# Patient Record
Sex: MALE | Race: WHITE | NOT HISPANIC OR LATINO | Employment: FULL TIME | URBAN - METROPOLITAN AREA
[De-identification: names, ages, dates, MRNs, and addresses within clinical notes are randomized per-mention and may not be internally consistent; named-entity substitution may affect disease eponyms.]

---

## 2018-02-07 ENCOUNTER — HOSPITAL ENCOUNTER (OUTPATIENT)
Dept: OTHER | Facility: HOSPITAL | Age: 48
Discharge: HOME | End: 2018-02-07
Attending: SURGERY | Admitting: SURGERY

## 2018-02-07 LAB
ANION GAP SERPL CALC-SCNC: 12.7 MMOL/L (ref 10–20)
BACTERIA SPEC AEROBE CULT: NORMAL
BASOPHILS # BLD AUTO: 0 10*3/UL (ref 0–0.2)
BASOPHILS NFR BLD AUTO: 0 % (ref 0–2)
BUN SERPL-MCNC: 14 MG/DL (ref 8–20)
BUN/CREAT SERPL: 11.7 (ref 6.2–20.3)
CALCIUM SERPL-MCNC: 9.8 MG/DL (ref 8.9–10.3)
CHLORIDE SERPL-SCNC: 100 MMOL/L (ref 101–111)
CONV CO2: 29 MMOL/L (ref 22–32)
CREAT UR-MCNC: 1.2 MG/DL (ref 0.7–1.2)
DIFFERENTIAL METHOD BLD: (no result)
EOSINOPHIL # BLD AUTO: 0.1 10*3/UL (ref 0–0.3)
EOSINOPHIL # BLD AUTO: 1 % (ref 0–3)
ERYTHROCYTE [DISTWIDTH] IN BLOOD BY AUTOMATED COUNT: 13.1 % (ref 11.5–14.5)
GLUCOSE SERPL-MCNC: 86 MG/DL (ref 65–99)
HCT VFR BLD AUTO: 53 % (ref 40–54)
HGB BLD-MCNC: (no result) G/DL (ref 14–18)
LYMPHOCYTES # BLD AUTO: 3.2 10*3/UL (ref 0.8–4.8)
LYMPHOCYTES NFR BLD AUTO: 31 % (ref 18–42)
Lab: NORMAL
MCH RBC QN AUTO: 33.5 PG (ref 26–32)
MCHC RBC AUTO-ENTMCNC: 34.9 G/DL (ref 32–36)
MCV RBC AUTO: 96 FL (ref 80–94)
MICRO REPORT STATUS: NORMAL
MONOCYTES # BLD AUTO: 1.3 10*3/UL (ref 0.1–1.3)
MONOCYTES NFR BLD AUTO: 13 % (ref 2–11)
NEUTROPHILS # BLD AUTO: 5.7 10*3/UL (ref 2.3–8.6)
NEUTROPHILS NFR BLD AUTO: 55 % (ref 50–75)
NRBC BLD AUTO-RTO: 0 /100{WBCS}
NRBC/RBC NFR BLD MANUAL: 0 10*3/UL
PLATELET # BLD AUTO: 171 10*3/UL (ref 150–450)
PMV BLD AUTO: 9.9 FL (ref 7.4–10.4)
POTASSIUM SERPL-SCNC: 3.7 MMOL/L (ref 3.6–5.1)
RBC # BLD AUTO: 5.53 10*6/UL (ref 4.6–6)
SODIUM SERPL-SCNC: 138 MMOL/L (ref 136–144)
SPECIMEN SOURCE: NORMAL
WBC # BLD AUTO: 10.3 10*3/UL (ref 4.5–11.5)

## 2018-02-12 ENCOUNTER — HOSPITAL ENCOUNTER (OUTPATIENT)
Dept: PREOP | Facility: HOSPITAL | Age: 48
Setting detail: HOSPITAL OUTPATIENT SURGERY
Discharge: HOME | End: 2018-02-12
Attending: SURGERY | Admitting: SURGERY

## 2019-09-01 ENCOUNTER — APPOINTMENT (OUTPATIENT)
Dept: GENERAL RADIOLOGY | Facility: HOSPITAL | Age: 49
End: 2019-09-01

## 2019-09-01 ENCOUNTER — HOSPITAL ENCOUNTER (EMERGENCY)
Facility: HOSPITAL | Age: 49
Discharge: HOME | End: 2019-09-01
Admitting: EMERGENCY MEDICINE

## 2019-09-01 VITALS
DIASTOLIC BLOOD PRESSURE: 96 MMHG | HEART RATE: 76 BPM | TEMPERATURE: 98.2 F | RESPIRATION RATE: 19 BRPM | SYSTOLIC BLOOD PRESSURE: 155 MMHG | HEIGHT: 75 IN | WEIGHT: 223.99 LBS | BODY MASS INDEX: 27.85 KG/M2 | OXYGEN SATURATION: 96 %

## 2019-09-01 DIAGNOSIS — S52.602A CLOSED FRACTURE OF DISTAL END OF LEFT ULNA, UNSPECIFIED FRACTURE MORPHOLOGY, INITIAL ENCOUNTER: ICD-10-CM

## 2019-09-01 DIAGNOSIS — S51.812A LACERATION OF LEFT FOREARM, INITIAL ENCOUNTER: Primary | ICD-10-CM

## 2019-09-01 PROCEDURE — 73090 X-RAY EXAM OF FOREARM: CPT

## 2019-09-01 PROCEDURE — 90715 TDAP VACCINE 7 YRS/> IM: CPT | Performed by: NURSE PRACTITIONER

## 2019-09-01 PROCEDURE — 73110 X-RAY EXAM OF WRIST: CPT

## 2019-09-01 PROCEDURE — 99283 EMERGENCY DEPT VISIT LOW MDM: CPT

## 2019-09-01 PROCEDURE — 25010000002 TDAP 5-2.5-18.5 LF-MCG/0.5 SUSPENSION: Performed by: NURSE PRACTITIONER

## 2019-09-01 PROCEDURE — 90471 IMMUNIZATION ADMIN: CPT | Performed by: NURSE PRACTITIONER

## 2019-09-01 PROCEDURE — 29105 APPLICATION LONG ARM SPLINT: CPT | Performed by: NURSE PRACTITIONER

## 2019-09-01 RX ORDER — CEPHALEXIN 500 MG/1
500 CAPSULE ORAL 3 TIMES DAILY
Qty: 30 CAPSULE | Refills: 0 | Status: SHIPPED | OUTPATIENT
Start: 2019-09-01

## 2019-09-01 RX ORDER — HYDROCODONE BITARTRATE AND ACETAMINOPHEN 7.5; 325 MG/1; MG/1
1-2 TABLET ORAL EVERY 6 HOURS PRN
Qty: 15 TABLET | Refills: 0 | Status: SHIPPED | OUTPATIENT
Start: 2019-09-01

## 2019-09-01 RX ORDER — TRAMADOL HYDROCHLORIDE 50 MG/1
50 TABLET ORAL ONCE
Status: COMPLETED | OUTPATIENT
Start: 2019-09-01 | End: 2019-09-01

## 2019-09-01 RX ADMIN — TETANUS TOXOID, REDUCED DIPHTHERIA TOXOID AND ACELLULAR PERTUSSIS VACCINE, ADSORBED 0.5 ML: 5; 2.5; 8; 8; 2.5 SUSPENSION INTRAMUSCULAR at 10:48

## 2019-09-01 RX ADMIN — TRAMADOL HYDROCHLORIDE 50 MG: 50 TABLET, FILM COATED ORAL at 10:48

## 2019-09-01 NOTE — ED PROVIDER NOTES
Subjective   Chief Complaint: Left arm pain  Context: Patient reports that he injured his left arm around 5 PM yesterday evening jumping in between boats.  Complains of pain to his left wrist and forearm.  He reports that he has a laceration on his left forearm.  Tetanus is not up-to-date.  Duration: 5 PM   timing: Constant  Severity: Mild to moderate  Associated symptoms and or modifying factors: As above          History provided by:  Patient      Review of Systems   Constitutional: Negative for fever.   Musculoskeletal: Positive for joint swelling.   Skin: Positive for wound.   Neurological: Negative for weakness and numbness.       History reviewed. No pertinent past medical history.    No Known Allergies    History reviewed. No pertinent surgical history.    History reviewed. No pertinent family history.    Social History     Socioeconomic History   • Marital status:      Spouse name: Not on file   • Number of children: Not on file   • Years of education: Not on file   • Highest education level: Not on file   Tobacco Use   • Smoking status: Never Smoker   Substance and Sexual Activity   • Drug use: No   • Sexual activity: Defer           Objective   Physical Exam  The patient is well-developed, well-nourished, alert, cooperative and in no acute distress.    HEENT exam is normocephalic and atraumatic. Pupils equal ,round, reactive to light. Extraocular muscles are intact. Conjunctivae non- injected, sclerae anicteric, lids without ptosis, edema or erythema.  Mucous membranes are moist.     Extremities: There is no significant deformity or joint abnormality.  The left wrist, there is pain on palpation of the left wrist and forearm.  There is a 4 cm laceration on the left forearm . peripheral pulses are intact.    Neurologic: Oriented x3 without focal neurological deficits.    Skin shows no rash, petechiae, or purpura.    Splint - Cast - Strapping  Date/Time: 9/1/2019 12:01 PM  Performed by: Gale  "FREDI Sylvester  Authorized by: Nga Beasley APRN     Consent:     Consent obtained:  Verbal    Consent given by:  Patient    Risks discussed:  Discoloration, numbness, pain and swelling    Alternatives discussed:  No treatment  Pre-procedure details:     Sensation:  Normal  Procedure details:     Laterality:  Left    Location:  Wrist    Wrist:  L wrist    Cast type:  Short arm    Splint type:  Sugar tong    Supplies:  Ortho-Glass  Post-procedure details:     Pain:  Improved    Sensation:  Normal    Patient tolerance of procedure:  Tolerated well, no immediate complications               ED Course        Labs Reviewed - No data to display  Xr Forearm 2 View Left    Result Date: 9/1/2019  1. Minimally displaced fracture at the junction of the proximal two thirds to distal third of the diaphysis of the ulna 2. Fracture is mildly comminuted 3. No evidence of open injury, there is mild soft tissue swelling  Electronically Signed By-Zachary Giang Jr. On:9/1/2019 10:53 AM This report was finalized on [id number] by  Zachary Giang Jr., .    Xr Wrist 3+ View Left    Result Date: 9/1/2019  1 fracture of the distal ulna as described in forearm report 2. No evidence of carpal or metacarpal fracture 3. Radial carpal joint is intact  Electronically Signed By-Zachary Giang Jr. On:9/1/2019 10:55 AM This report was finalized on [id number] by  Zachary Giang Jr., .    Medications   Tdap (BOOSTRIX) injection 0.5 mL (0.5 mL Intramuscular Given 9/1/19 1048)   traMADol (ULTRAM) tablet 50 mg (50 mg Oral Given 9/1/19 1048)     /84 (BP Location: Right arm, Patient Position: Sitting)   Pulse 82   Temp 98 °F (36.7 °C) (Oral)   Resp 19   Ht 190.5 cm (75\")   Wt 102 kg (223 lb 15.8 oz)   SpO2 94%   BMI 28.00 kg/m²             MDM  Number of Diagnoses or Management Options  Closed fracture of distal end of left ulna, unspecified fracture morphology, initial encounter:   Laceration of left forearm, initial encounter:   Diagnosis " management comments: MEDICAL DECISION  Comorbidities:  Differentials:  ; this list is not all inclusive and does not constitute the entirety of considered causes  Radiology interpretation:  Images reviewed by me and interpreted by radiologist,   Tetanus was updated.  Laceration was cleansed by nursing staff and Steri-Strips were applied by nursing staff as well as dressing.  Discussed with patient healing by secondary intention related to delayed presentation for laceration.  Patient will be placed on Keflex and Norco    Patient was placed in an Ortho-Glass sugar tong splint and sling neurovascularly intact with good anatomic alignment post splint application..       Amount and/or Complexity of Data Reviewed  Tests in the radiology section of CPT®: ordered and reviewed          Final diagnoses:   Laceration of left forearm, initial encounter   Closed fracture of distal end of left ulna, unspecified fracture morphology, initial encounter            Nga Beasley, FREDI  09/01/19 1204

## 2019-09-01 NOTE — DISCHARGE INSTRUCTIONS
Keep splint clean, dry, and intact.  Ice and elevate.  Norco for pain if needed.  Follow-up with Dr. Rogers next week.  Keflex as prescribed.

## 2025-06-30 ENCOUNTER — HOSPITAL ENCOUNTER (INPATIENT)
Facility: HOSPITAL | Age: 55
LOS: 2 days | Discharge: HOME | End: 2025-07-02
Attending: EMERGENCY MEDICINE | Admitting: INTERNAL MEDICINE
Payer: COMMERCIAL

## 2025-06-30 ENCOUNTER — APPOINTMENT (OUTPATIENT)
Dept: CARDIOLOGY | Facility: HOSPITAL | Age: 55
End: 2025-06-30
Payer: COMMERCIAL

## 2025-06-30 DIAGNOSIS — I21.19 ACUTE ST ELEVATION MYOCARDIAL INFARCTION (STEMI) INVOLVING OTHER CORONARY ARTERY OF INFERIOR WALL: Primary | ICD-10-CM

## 2025-06-30 PROBLEM — I21.11 STEMI INVOLVING RIGHT CORONARY ARTERY: Chronic | Status: ACTIVE | Noted: 2025-06-30

## 2025-06-30 PROBLEM — I21.11 STEMI INVOLVING RIGHT CORONARY ARTERY: Status: ACTIVE | Noted: 2025-06-30

## 2025-06-30 LAB
ACT BLD: 158 SECONDS (ref 89–137)
ACT BLD: 182 SECONDS (ref 89–137)
ACT BLD: 354 SECONDS (ref 89–137)
ALBUMIN SERPL-MCNC: 4.2 G/DL (ref 3.5–5.2)
ALBUMIN/GLOB SERPL: 1.4 G/DL
ALP SERPL-CCNC: 71 U/L (ref 39–117)
ALT SERPL W P-5'-P-CCNC: 25 U/L (ref 1–41)
ANION GAP SERPL CALCULATED.3IONS-SCNC: 14.3 MMOL/L (ref 5–15)
AST SERPL-CCNC: 31 U/L (ref 1–40)
BASOPHILS # BLD AUTO: 0.05 10*3/MM3 (ref 0–0.2)
BASOPHILS NFR BLD AUTO: 0.5 % (ref 0–1.5)
BILIRUB SERPL-MCNC: 0.7 MG/DL (ref 0–1.2)
BUN SERPL-MCNC: 17.5 MG/DL (ref 6–20)
BUN/CREAT SERPL: 11.4 (ref 7–25)
CALCIUM SPEC-SCNC: 9.3 MG/DL (ref 8.6–10.5)
CHLORIDE SERPL-SCNC: 98 MMOL/L (ref 98–107)
CO2 SERPL-SCNC: 25.7 MMOL/L (ref 22–29)
CREAT SERPL-MCNC: 1.54 MG/DL (ref 0.76–1.27)
DEPRECATED RDW RBC AUTO: 46.5 FL (ref 37–54)
EGFRCR SERPLBLD CKD-EPI 2021: 53.3 ML/MIN/1.73
EOSINOPHIL # BLD AUTO: 0.09 10*3/MM3 (ref 0–0.4)
EOSINOPHIL NFR BLD AUTO: 0.9 % (ref 0.3–6.2)
ERYTHROCYTE [DISTWIDTH] IN BLOOD BY AUTOMATED COUNT: 13.6 % (ref 12.3–15.4)
GEN 5 1HR TROPONIN T REFLEX: 377 NG/L
GLOBULIN UR ELPH-MCNC: 2.9 GM/DL
GLUCOSE SERPL-MCNC: 139 MG/DL (ref 65–99)
HCT VFR BLD AUTO: 53.1 % (ref 37.5–51)
HGB BLD-MCNC: 18.1 G/DL (ref 13–17.7)
HOLD SPECIMEN: NORMAL
HOLD SPECIMEN: NORMAL
IMM GRANULOCYTES # BLD AUTO: 0.03 10*3/MM3 (ref 0–0.05)
IMM GRANULOCYTES NFR BLD AUTO: 0.3 % (ref 0–0.5)
INR PPP: 1.03 (ref 0.9–1.1)
LYMPHOCYTES # BLD AUTO: 3.6 10*3/MM3 (ref 0.7–3.1)
LYMPHOCYTES NFR BLD AUTO: 36.3 % (ref 19.6–45.3)
MAGNESIUM SERPL-MCNC: 2.3 MG/DL (ref 1.6–2.6)
MCH RBC QN AUTO: 32.1 PG (ref 26.6–33)
MCHC RBC AUTO-ENTMCNC: 34.1 G/DL (ref 31.5–35.7)
MCV RBC AUTO: 94.1 FL (ref 79–97)
MONOCYTES # BLD AUTO: 1.12 10*3/MM3 (ref 0.1–0.9)
MONOCYTES NFR BLD AUTO: 11.3 % (ref 5–12)
NEUTROPHILS NFR BLD AUTO: 5.04 10*3/MM3 (ref 1.7–7)
NEUTROPHILS NFR BLD AUTO: 50.7 % (ref 42.7–76)
NRBC BLD AUTO-RTO: 0 /100 WBC (ref 0–0.2)
PLATELET # BLD AUTO: 193 10*3/MM3 (ref 140–450)
PMV BLD AUTO: 10.6 FL (ref 6–12)
POTASSIUM SERPL-SCNC: 3.4 MMOL/L (ref 3.5–5.2)
PROT SERPL-MCNC: 7.1 G/DL (ref 6–8.5)
PROTHROMBIN TIME: 13.4 SECONDS (ref 11.7–14.2)
RBC # BLD AUTO: 5.64 10*6/MM3 (ref 4.14–5.8)
SODIUM SERPL-SCNC: 138 MMOL/L (ref 136–145)
TROPONIN T % DELTA: 33
TROPONIN T NUMERIC DELTA: 93 NG/L
TROPONIN T SERPL HS-MCNC: 284 NG/L
WBC NRBC COR # BLD AUTO: 9.93 10*3/MM3 (ref 3.4–10.8)
WHOLE BLOOD HOLD COAG: NORMAL
WHOLE BLOOD HOLD SPECIMEN: NORMAL

## 2025-06-30 PROCEDURE — 85025 COMPLETE CBC W/AUTO DIFF WBC: CPT | Performed by: EMERGENCY MEDICINE

## 2025-06-30 PROCEDURE — 25010000002 HEPARIN (PORCINE) PER 1000 UNITS

## 2025-06-30 PROCEDURE — 25810000003 SODIUM CHLORIDE 0.9 % SOLUTION: Performed by: INTERNAL MEDICINE

## 2025-06-30 PROCEDURE — C1725 CATH, TRANSLUMIN NON-LASER: HCPCS | Performed by: INTERNAL MEDICINE

## 2025-06-30 PROCEDURE — 25010000002 EPTIFIBATIDE PER 5 MG: Performed by: INTERNAL MEDICINE

## 2025-06-30 PROCEDURE — 93458 L HRT ARTERY/VENTRICLE ANGIO: CPT | Performed by: INTERNAL MEDICINE

## 2025-06-30 PROCEDURE — 25010000002 MIDAZOLAM PER 1 MG: Performed by: INTERNAL MEDICINE

## 2025-06-30 PROCEDURE — 4A023N7 MEASUREMENT OF CARDIAC SAMPLING AND PRESSURE, LEFT HEART, PERCUTANEOUS APPROACH: ICD-10-PCS | Performed by: INTERNAL MEDICINE

## 2025-06-30 PROCEDURE — C9606 PERC D-E COR REVASC W AMI S: HCPCS | Performed by: INTERNAL MEDICINE

## 2025-06-30 PROCEDURE — 36415 COLL VENOUS BLD VENIPUNCTURE: CPT

## 2025-06-30 PROCEDURE — 93005 ELECTROCARDIOGRAM TRACING: CPT | Performed by: EMERGENCY MEDICINE

## 2025-06-30 PROCEDURE — 99223 1ST HOSP IP/OBS HIGH 75: CPT | Performed by: INTERNAL MEDICINE

## 2025-06-30 PROCEDURE — 25010000002 NITROGLYCERIN 5 MG/ML SOLUTION: Performed by: INTERNAL MEDICINE

## 2025-06-30 PROCEDURE — C1894 INTRO/SHEATH, NON-LASER: HCPCS | Performed by: INTERNAL MEDICINE

## 2025-06-30 PROCEDURE — 92941 PRQ TRLML REVSC TOT OCCL AMI: CPT | Performed by: INTERNAL MEDICINE

## 2025-06-30 PROCEDURE — 93005 ELECTROCARDIOGRAM TRACING: CPT

## 2025-06-30 PROCEDURE — 83735 ASSAY OF MAGNESIUM: CPT | Performed by: EMERGENCY MEDICINE

## 2025-06-30 PROCEDURE — 25010000002 EPTIFIBATIDE 20 MG/10ML SOLUTION: Performed by: INTERNAL MEDICINE

## 2025-06-30 PROCEDURE — C1769 GUIDE WIRE: HCPCS | Performed by: INTERNAL MEDICINE

## 2025-06-30 PROCEDURE — 25010000002 ONDANSETRON PER 1 MG

## 2025-06-30 PROCEDURE — 93010 ELECTROCARDIOGRAM REPORT: CPT | Performed by: INTERNAL MEDICINE

## 2025-06-30 PROCEDURE — 93306 TTE W/DOPPLER COMPLETE: CPT | Performed by: INTERNAL MEDICINE

## 2025-06-30 PROCEDURE — 027035Z DILATION OF CORONARY ARTERY, ONE ARTERY WITH TWO DRUG-ELUTING INTRALUMINAL DEVICES, PERCUTANEOUS APPROACH: ICD-10-PCS | Performed by: INTERNAL MEDICINE

## 2025-06-30 PROCEDURE — C1887 CATHETER, GUIDING: HCPCS | Performed by: INTERNAL MEDICINE

## 2025-06-30 PROCEDURE — B2151ZZ FLUOROSCOPY OF LEFT HEART USING LOW OSMOLAR CONTRAST: ICD-10-PCS | Performed by: INTERNAL MEDICINE

## 2025-06-30 PROCEDURE — 84484 ASSAY OF TROPONIN QUANT: CPT | Performed by: INTERNAL MEDICINE

## 2025-06-30 PROCEDURE — 85347 COAGULATION TIME ACTIVATED: CPT | Performed by: INTERNAL MEDICINE

## 2025-06-30 PROCEDURE — 25010000002 FENTANYL CITRATE (PF) 100 MCG/2ML SOLUTION: Performed by: INTERNAL MEDICINE

## 2025-06-30 PROCEDURE — 99152 MOD SED SAME PHYS/QHP 5/>YRS: CPT | Performed by: INTERNAL MEDICINE

## 2025-06-30 PROCEDURE — 85347 COAGULATION TIME ACTIVATED: CPT

## 2025-06-30 PROCEDURE — B2111ZZ FLUOROSCOPY OF MULTIPLE CORONARY ARTERIES USING LOW OSMOLAR CONTRAST: ICD-10-PCS | Performed by: INTERNAL MEDICINE

## 2025-06-30 PROCEDURE — 25010000002 LIDOCAINE 1 % SOLUTION: Performed by: INTERNAL MEDICINE

## 2025-06-30 PROCEDURE — 84484 ASSAY OF TROPONIN QUANT: CPT | Performed by: EMERGENCY MEDICINE

## 2025-06-30 PROCEDURE — 85610 PROTHROMBIN TIME: CPT | Performed by: EMERGENCY MEDICINE

## 2025-06-30 PROCEDURE — 25510000001 IOPAMIDOL PER 1 ML: Performed by: INTERNAL MEDICINE

## 2025-06-30 PROCEDURE — C1874 STENT, COATED/COV W/DEL SYS: HCPCS | Performed by: INTERNAL MEDICINE

## 2025-06-30 PROCEDURE — 25010000002 HEPARIN (PORCINE) PER 1000 UNITS: Performed by: INTERNAL MEDICINE

## 2025-06-30 PROCEDURE — 93005 ELECTROCARDIOGRAM TRACING: CPT | Performed by: INTERNAL MEDICINE

## 2025-06-30 PROCEDURE — 99285 EMERGENCY DEPT VISIT HI MDM: CPT

## 2025-06-30 PROCEDURE — 80053 COMPREHEN METABOLIC PANEL: CPT | Performed by: EMERGENCY MEDICINE

## 2025-06-30 PROCEDURE — 93306 TTE W/DOPPLER COMPLETE: CPT

## 2025-06-30 DEVICE — XIENCE SKYPOINT™ EVEROLIMUS ELUTING CORONARY STENT SYSTEM 4.00 MM X 18 MM / RAPID-EXCHANGE
Type: IMPLANTABLE DEVICE | Site: CORONARY | Status: FUNCTIONAL
Brand: XIENCE SKYPOINT™

## 2025-06-30 DEVICE — XIENCE SKYPOINT™ EVEROLIMUS ELUTING CORONARY STENT SYSTEM 4.50 MM X 28 MM / RAPID-EXCHANGE
Type: IMPLANTABLE DEVICE | Site: CORONARY | Status: FUNCTIONAL
Brand: XIENCE SKYPOINT™

## 2025-06-30 RX ORDER — SODIUM CHLORIDE 9 MG/ML
3 INJECTION, SOLUTION INTRAVENOUS CONTINUOUS
Status: DISPENSED | OUTPATIENT
Start: 2025-06-30 | End: 2025-06-30

## 2025-06-30 RX ORDER — ASPIRIN 81 MG/1
TABLET, CHEWABLE ORAL
Status: COMPLETED
Start: 2025-06-30 | End: 2025-06-30

## 2025-06-30 RX ORDER — ASPIRIN 81 MG/1
324 TABLET, CHEWABLE ORAL ONCE
Status: COMPLETED | OUTPATIENT
Start: 2025-06-30 | End: 2025-06-30

## 2025-06-30 RX ORDER — NITROGLYCERIN 0.4 MG/1
0.4 TABLET SUBLINGUAL
Status: DISCONTINUED | OUTPATIENT
Start: 2025-06-30 | End: 2025-07-02 | Stop reason: HOSPADM

## 2025-06-30 RX ORDER — SODIUM CHLORIDE 9 MG/ML
50 INJECTION, SOLUTION INTRAVENOUS CONTINUOUS
Status: DISCONTINUED | OUTPATIENT
Start: 2025-06-30 | End: 2025-07-02 | Stop reason: HOSPADM

## 2025-06-30 RX ORDER — EPTIFIBATIDE 0.75 MG/ML
INJECTION, SOLUTION INTRAVENOUS
Status: COMPLETED | OUTPATIENT
Start: 2025-06-30 | End: 2025-06-30

## 2025-06-30 RX ORDER — SODIUM CHLORIDE 0.9 % (FLUSH) 0.9 %
10 SYRINGE (ML) INJECTION AS NEEDED
Status: DISCONTINUED | OUTPATIENT
Start: 2025-06-30 | End: 2025-07-02 | Stop reason: HOSPADM

## 2025-06-30 RX ORDER — NITROGLYCERIN 20 MG/100ML
INJECTION INTRAVENOUS
Status: DISCONTINUED
Start: 2025-06-30 | End: 2025-06-30 | Stop reason: WASHOUT

## 2025-06-30 RX ORDER — ACETAMINOPHEN 325 MG/1
650 TABLET ORAL EVERY 4 HOURS PRN
Status: DISCONTINUED | OUTPATIENT
Start: 2025-06-30 | End: 2025-07-02 | Stop reason: HOSPADM

## 2025-06-30 RX ORDER — TICAGRELOR 90 MG/1
90 TABLET, FILM COATED ORAL 2 TIMES DAILY
Status: DISCONTINUED | OUTPATIENT
Start: 2025-06-30 | End: 2025-07-02 | Stop reason: HOSPADM

## 2025-06-30 RX ORDER — NITROGLYCERIN 5 MG/ML
INJECTION, SOLUTION INTRAVENOUS
Status: DISCONTINUED | OUTPATIENT
Start: 2025-06-30 | End: 2025-06-30 | Stop reason: HOSPADM

## 2025-06-30 RX ORDER — LIDOCAINE HYDROCHLORIDE 10 MG/ML
INJECTION, SOLUTION INFILTRATION; PERINEURAL
Status: DISCONTINUED | OUTPATIENT
Start: 2025-06-30 | End: 2025-06-30 | Stop reason: HOSPADM

## 2025-06-30 RX ORDER — TICAGRELOR 90 MG/1
TABLET, FILM COATED ORAL
Status: DISCONTINUED | OUTPATIENT
Start: 2025-06-30 | End: 2025-06-30 | Stop reason: HOSPADM

## 2025-06-30 RX ORDER — HEPARIN SODIUM 1000 [USP'U]/ML
INJECTION, SOLUTION INTRAVENOUS; SUBCUTANEOUS
Status: DISCONTINUED | OUTPATIENT
Start: 2025-06-30 | End: 2025-06-30 | Stop reason: HOSPADM

## 2025-06-30 RX ORDER — SODIUM CHLORIDE 9 MG/ML
250 INJECTION, SOLUTION INTRAVENOUS ONCE AS NEEDED
Status: DISCONTINUED | OUTPATIENT
Start: 2025-06-30 | End: 2025-07-02 | Stop reason: HOSPADM

## 2025-06-30 RX ORDER — HEPARIN SODIUM 1000 [USP'U]/ML
INJECTION, SOLUTION INTRAVENOUS; SUBCUTANEOUS
Status: COMPLETED
Start: 2025-06-30 | End: 2025-06-30

## 2025-06-30 RX ORDER — ATORVASTATIN CALCIUM 40 MG/1
40 TABLET, FILM COATED ORAL NIGHTLY
Status: DISCONTINUED | OUTPATIENT
Start: 2025-06-30 | End: 2025-07-02 | Stop reason: HOSPADM

## 2025-06-30 RX ORDER — HYDROCODONE BITARTRATE AND ACETAMINOPHEN 7.5; 325 MG/1; MG/1
2 TABLET ORAL EVERY 6 HOURS PRN
Refills: 0 | Status: DISCONTINUED | OUTPATIENT
Start: 2025-06-30 | End: 2025-07-02 | Stop reason: HOSPADM

## 2025-06-30 RX ORDER — IOPAMIDOL 755 MG/ML
INJECTION, SOLUTION INTRAVASCULAR
Status: DISCONTINUED | OUTPATIENT
Start: 2025-06-30 | End: 2025-06-30 | Stop reason: HOSPADM

## 2025-06-30 RX ORDER — ONDANSETRON 2 MG/ML
INJECTION INTRAMUSCULAR; INTRAVENOUS
Status: COMPLETED
Start: 2025-06-30 | End: 2025-06-30

## 2025-06-30 RX ORDER — HEPARIN SODIUM 1000 [USP'U]/ML
4000 INJECTION, SOLUTION INTRAVENOUS; SUBCUTANEOUS ONCE
Status: COMPLETED | OUTPATIENT
Start: 2025-06-30 | End: 2025-06-30

## 2025-06-30 RX ORDER — MIDAZOLAM HYDROCHLORIDE 1 MG/ML
INJECTION, SOLUTION INTRAMUSCULAR; INTRAVENOUS
Status: DISCONTINUED | OUTPATIENT
Start: 2025-06-30 | End: 2025-06-30 | Stop reason: HOSPADM

## 2025-06-30 RX ORDER — FENTANYL CITRATE 50 UG/ML
INJECTION, SOLUTION INTRAMUSCULAR; INTRAVENOUS
Status: DISCONTINUED | OUTPATIENT
Start: 2025-06-30 | End: 2025-06-30 | Stop reason: HOSPADM

## 2025-06-30 RX ORDER — BISOPROLOL FUMARATE 5 MG/1
5 TABLET, FILM COATED ORAL
Status: DISCONTINUED | OUTPATIENT
Start: 2025-06-30 | End: 2025-07-02 | Stop reason: HOSPADM

## 2025-06-30 RX ORDER — POTASSIUM CHLORIDE 1500 MG/1
20 TABLET, EXTENDED RELEASE ORAL ONCE
Status: COMPLETED | OUTPATIENT
Start: 2025-06-30 | End: 2025-06-30

## 2025-06-30 RX ORDER — SODIUM CHLORIDE 9 MG/ML
INJECTION, SOLUTION INTRAVENOUS
Status: COMPLETED | OUTPATIENT
Start: 2025-06-30 | End: 2025-06-30

## 2025-06-30 RX ORDER — ONDANSETRON 2 MG/ML
4 INJECTION INTRAMUSCULAR; INTRAVENOUS ONCE
Status: COMPLETED | OUTPATIENT
Start: 2025-06-30 | End: 2025-06-30

## 2025-06-30 RX ORDER — EPTIFIBATIDE 2 MG/ML
INJECTION, SOLUTION INTRAVENOUS
Status: DISCONTINUED | OUTPATIENT
Start: 2025-06-30 | End: 2025-06-30 | Stop reason: HOSPADM

## 2025-06-30 RX ADMIN — ONDANSETRON 4 MG: 2 INJECTION INTRAMUSCULAR; INTRAVENOUS at 15:25

## 2025-06-30 RX ADMIN — ATORVASTATIN CALCIUM 40 MG: 40 TABLET, FILM COATED ORAL at 22:15

## 2025-06-30 RX ADMIN — SODIUM CHLORIDE 3 ML/KG/HR: 9 INJECTION, SOLUTION INTRAVENOUS at 17:40

## 2025-06-30 RX ADMIN — ACETAMINOPHEN 650 MG: 325 TABLET, FILM COATED ORAL at 17:29

## 2025-06-30 RX ADMIN — HEPARIN SODIUM: 1000 INJECTION, SOLUTION INTRAVENOUS; SUBCUTANEOUS at 15:25

## 2025-06-30 RX ADMIN — ASPIRIN: 81 TABLET, CHEWABLE ORAL at 15:25

## 2025-06-30 RX ADMIN — TICAGRELOR 90 MG: 90 TABLET ORAL at 22:15

## 2025-06-30 RX ADMIN — BISOPROLOL FUMARATE 5 MG: 5 TABLET ORAL at 18:36

## 2025-06-30 RX ADMIN — POTASSIUM CHLORIDE 20 MEQ: 1500 TABLET, EXTENDED RELEASE ORAL at 18:36

## 2025-06-30 NOTE — CONSULTS
Cardiology Consult Note    Patient Identification:  Name: Amrik Dowell  Age: 55 y.o.  Sex: male  :  1970  MRN: [id number]             Requesting Physician : Dr. Storm Ndiaye    Reason for Consultation / Chief Complaint :   STEMI    History of Present Illness:      Mr. Amrik Dowell has PMH of    Hypertension  Erythrocytosis/polycythemia  Positive family history of premature CAD in grandfather  Cigarette smoker    Presented through emergency room with complaint of chest pain.  Patient has been having chest pain for over a day now which is burning and type radiating to the left  shoulder, thought it was acid reflux worse therefore came into the hospital.  Was found to have ST elevation inferiorly therefore interventional cardiology was consulted.      Assessment:  :    Acute inferior wall STEMI  CAD  Hypertension  Dyslipidemia  Elevated creatinine  Positive family history  Cigarette smoker      Recommendations / Plan:        Patient presented with chest pain which was over 1 day duration.  EKG in the emergency room revealed ST elevation in inferior leads therefore interventional cardiology was consulted.  Patient was having chest pain, was taken to the Cath Lab for primary PCI.  Risk-benefit alternatives explained and patient underwent drug-eluting stent to proximal and mid RCA which was subtotal.  And was hazy and with plaque.  Patient was given Integrilin intravenously, IV heparin intravenously and started on Brilinta orally.  Patient's LVEDP was low at 6 mmHg, will hydrate and monitor renal function.  Will give Brilinta 90 twice daily, baby aspirin, statins and beta-blockers as tolerated.  Counseled on smoking cessation.  Discussed with patient his wife and mother by bedside.  Sinus rhythm          Cardiographics  ECG: EKG tracing was  personally reviewed/interpreted by me  ECG 12 Lead Rhythm Change   Preliminary Result   HEART RATE=92  bpm   RR Qvbonzsu=868  ms   NE Udgofjao=329  ms   P Horizontal  Axis=-3  deg   P Front Axis=66  deg   QRSD Ydpdqcue=553  ms   QT Xaxbipsf=068  ms   YQuY=441  ms   QRS Axis=-16  deg   T Wave Axis=-29  deg   - ABNORMAL ECG -   Sinus rhythm   Ventricular premature complex   Evolving inferior infarct since 30-Jun-2025 15:16:35   ST elevation, consider anterolateral injury   When compared with ECG of 30-Jun-2025 15:16:35,   Significant rate increase   New or worsened ischemia or infarction   Date and Time of Study:2025-06-30 17:32:55      Telemetry Scan   Final Result      ECG 12 Lead Chest Pain   Preliminary Result   HEART RATE=61  bpm   RR Skbtcqjw=776  ms   OK Solttezb=684  ms   P Horizontal Axis=0  deg   P Front Axis=57  deg   QRSD Interval=92  ms   QT Omoszzqb=457  ms   YFfZ=475  ms   QRS Axis=58  deg   T Wave Axis=81  deg   - ABNORMAL ECG -   Sinus rhythm   Inferior infarct, acute (RCA)   ST elevation, consider lateral injury   Date and Time of Study:2025-06-30 15:16:35      ECG 12 Lead Chest Pain    (Results Pending)       Telemetry: Sinus rhythm             Diagnosis Plan   1. Acute ST elevation myocardial infarction (STEMI) involving other coronary artery of inferior wall                             Past Medical History:  No past medical history on file.  Past Surgical History:  No past surgical history on file.   Allergies:  No Known Allergies  Home Meds:  Medications Prior to Admission   Medication Sig Dispense Refill Last Dose/Taking    HYDROcodone-acetaminophen (NORCO) 7.5-325 MG per tablet Take 1-2 tablets by mouth Every 6 (Six) Hours As Needed for Moderate Pain . 15 tablet 0      Current Meds:     Current Facility-Administered Medications:     acetaminophen (TYLENOL) tablet 650 mg, 650 mg, Oral, Q4H PRN, Warner Zaidi MD, 650 mg at 06/30/25 1729    atorvastatin (LIPITOR) tablet 40 mg, 40 mg, Oral, Nightly, Warner Zaidi MD    atropine sulfate (0.1 mg/mL) Intravenous 0.5 mg / 5 mL, 0.5 mg, Intravenous, Q5 Min PRN, Warner Zaidi MD     "bisoprolol (ZEBeta) tablet 5 mg, 5 mg, Oral, Q24H, Warner Zaidi MD    [Held by provider] HYDROcodone-acetaminophen (NORCO) 7.5-325 MG per tablet 2 tablet, 2 tablet, Oral, Q6H PRN, Warner Zaidi MD    nitroglycerin (NITROSTAT) SL tablet 0.4 mg, 0.4 mg, Sublingual, Q5 Min PRN, Warner Zaidi MD    potassium chloride (KLOR-CON M20) CR tablet 20 mEq, 20 mEq, Oral, Once, Day, Beatriz G, APRN    sodium chloride 0.9 % flush 10 mL, 10 mL, Intravenous, PRN, Warner Zaidi MD    sodium chloride 0.9 % infusion 250 mL, 250 mL, Intravenous, Once PRN, Warner Zaidi MD    sodium chloride 0.9 % infusion, 50 mL/hr, Intravenous, Continuous, Warner Zaidi MD    sodium chloride 0.9 % infusion, 3 mL/kg/hr, Intravenous, Continuous, Warner Zaidi MD, Last Rate: 309 mL/hr at 06/30/25 1740, 3 mL/kg/hr at 06/30/25 1740  Social History:   Social History     Tobacco Use    Smoking status: Never    Smokeless tobacco: Not on file   Substance Use Topics    Alcohol use: Not on file      Family History:  No family history on file.     Review of Systems : Review of Systems   All other systems reviewed and are negative.             Constitutional:  Temp:  [97.7 °F (36.5 °C)-98.7 °F (37.1 °C)] 97.7 °F (36.5 °C)  Heart Rate:  [71-94] 71  Resp:  [15-18] 18  BP: (129-147)/(77-93) 131/87    Physical Exam   /87   Pulse 71   Temp 97.7 °F (36.5 °C) (Oral)   Resp 18   Ht 190.5 cm (75\")   Wt 103 kg (227 lb 15.3 oz)   SpO2 99%   BMI 28.49 kg/m²   Physical Exam  General:  Appears in no acute distress  Eyes: Sclerae are anicteric,  conjunctivae are clear   HEENT:  No JVD. Thyroid not visibly enlarged. No mucosal pallor or cyanosis  Respiratory: Respirations regular and unlabored at rest.  Bilaterally good breath sounds with good air entry in all fields. No crackles, rubs or wheezes auscultated  Cardiovascular: S1,S2 Regular rate and rhythm. No murmur, rub or gallop " auscultated. No pretibial pitting edema  Gastrointestinal: Abdomen nondistended.  Musculoskeletal:  No abnormal movements  Extremities: No digital clubbing or cyanosis  Skin: Color pink. Skin warm and dry to touch.   Neuro: Alert and awake, no lateralizing deficits appreciated        Echocardiogram:       STRESS TEST        Cardiolite (Tc-99m sestamibi) stress test    HEART CATHETERIZATION  No results found for this or any previous visit.        Imaging  Chest X-ray:   Imaging Results (Last 24 Hours)       ** No results found for the last 24 hours. **            Lab Review: I have reviewed the labs  Results from last 7 days   Lab Units 06/30/25  1528   HSTROP T ng/L 284*     Results from last 7 days   Lab Units 06/30/25  1528   MAGNESIUM mg/dL 2.3     Results from last 7 days   Lab Units 06/30/25  1528   SODIUM mmol/L 138   POTASSIUM mmol/L 3.4*   BUN mg/dL 17.5   CREATININE mg/dL 1.54*   CALCIUM mg/dL 9.3             Results from last 7 days   Lab Units 06/30/25  1528   WBC 10*3/mm3 9.93   HEMOGLOBIN g/dL 18.1*   HEMATOCRIT % 53.1*   PLATELETS 10*3/mm3 193     Results from last 7 days   Lab Units 06/30/25  1528   INR  1.03             Warner Zaidi MD  6/30/2025, 18:22 EDT      EMR Dragon/Transcription:   Dictated utilizing Dragon dictation

## 2025-06-30 NOTE — Clinical Note
First balloon inflation max pressure = 10 lionel. First balloon inflation duration = 10 seconds. Second inflation of balloon - Max pressure = 10 lionel. 2nd Inflation of balloon - Duration = 11 seconds.

## 2025-06-30 NOTE — ED PROVIDER NOTES
"Subjective   History of Present Illness  Chief complaint: Chest pain    55-year-old male presents with chest pain.  Patient states he has had pain intermittently over the past couple days but it became worse this morning.  He describes as a heaviness on his chest that radiates to his left shoulder.  He denies any significant shortness of breath.  He has had no diaphoresis or nausea.  He denies any alleviating or exacerbating factors.    History provided by:  Patient      Review of Systems   Constitutional:  Negative for fever.   HENT:  Negative for congestion.    Respiratory:  Negative for cough and shortness of breath.    Cardiovascular:  Positive for chest pain.   Gastrointestinal:  Negative for abdominal pain and vomiting.   Musculoskeletal:  Negative for back pain.   Neurological:  Negative for headaches.   Psychiatric/Behavioral:  Negative for confusion.        No past medical history on file.    No Known Allergies    No past surgical history on file.    No family history on file.    Social History     Socioeconomic History    Marital status:    Tobacco Use    Smoking status: Never   Substance and Sexual Activity    Drug use: No    Sexual activity: Defer       /77   Pulse 80   Ht 190.5 cm (75\")   Wt 103 kg (227 lb 15.3 oz)   SpO2 97%   BMI 28.49 kg/m²       Objective   Physical Exam  Vitals and nursing note reviewed.   Constitutional:       Appearance: He is ill-appearing.   HENT:      Head: Normocephalic and atraumatic.   Cardiovascular:      Rate and Rhythm: Normal rate and regular rhythm.      Heart sounds: Normal heart sounds.   Pulmonary:      Effort: Pulmonary effort is normal. No respiratory distress.      Breath sounds: Normal breath sounds.   Abdominal:      Palpations: Abdomen is soft.      Tenderness: There is no abdominal tenderness.   Musculoskeletal:      Right lower leg: No tenderness. No edema.      Left lower leg: No tenderness. No edema.   Skin:     General: Skin is warm and " dry.   Neurological:      Mental Status: He is alert and oriented to person, place, and time.         Procedures           ED Course      My interpretation of EKG shows sinus rhythm, rate of 61, ST elevation inferiorly with reciprocal changes consistent with acute inferior STEMI                                                 Medical Decision Making  Problems Addressed:  Acute ST elevation myocardial infarction (STEMI) involving other coronary artery of inferior wall: complicated acute illness or injury    Amount and/or Complexity of Data Reviewed  Labs: ordered.  Radiology: ordered.  ECG/medicine tests: ordered.    Risk  Prescription drug management.      Initial EKG showed inferior STEMI.  STEMI protocol was initiated.  I discussed with Dr. Zaidi with cardiology who will take the patient to the Cath Lab.  Patient remained hemodynamically stable throughout his short ER stay      Final diagnoses:   Acute ST elevation myocardial infarction (STEMI) involving other coronary artery of inferior wall       ED Disposition  ED Disposition       ED Disposition   Send to Cath Lab    Condition   --    Comment   --               No follow-up provider specified.       Medication List      No changes were made to your prescriptions during this visit.            Storm Ndiaye MD  06/30/25 1524

## 2025-06-30 NOTE — H&P
"Critical Care History and Physical     Amrik Dowell : 1970 MRN:[id number] LOS:0 ROOM: Pool/Cath     Reason for admission: STEMI involving right coronary artery     Assessment / Plan     STEMI  Coronary artery disease involving native coronary artery  New diagnosis  HS Troponin 284, monitor reflex.  Cardiac catheterization 2025 with PCI and stents to the culprit lesion, RCA  c/w aspirin, beta blockers and statins.    Erythrocytosis/polycythemia  Per patient report he has serial monitoring and his PCP instructed him when to go donate blood  Hemoglobin 18.1, hematocrit 53.1  Recommend follow-up with PCP, defer to discharging provider    Tobacco abuse disorder  Smokes 2 packs cigarettes per day  Started smoking at 15 years of age (41 years)  Strongly encouraged to stop smoking for cardiac health and general lifestyle  The patient reports he is ready to attempt quitting          Nutrition:   NPO Diet NPO Type: Strict NPO     VTE Prophylaxis:  No VTE prophylaxis order currently exists.         History of Present illness     Amrik Dowell is a 55 y.o. male with PMH of erythrocytosis and tobacco abuse disorder who presented to the ED today for evaluation of chest pain.  The pain had been present intermittently for 2 to 3 days which felt like a burning sensation similar to heartburn and \"felt like I just needed to belch\".  He treated his symptoms with over-the-counter heartburn medications with intermittent relief.  He reports associated pain in the left shoulder which was present for most of the weekend.  He states that he is fairly active and felt the shoulder pain was related to some activity.  He reports that his pain occurred both at rest and with activity.  He denies associated shortness of air, nausea, vomiting, palpitations, headache, blurred vision or numbness/tingling of the periphery.  He states that he just generally felt unwell all over.  He had a couple of instances of sweating however he " associated it with the heat outside.  Today his chest pressure/burning pain became more persistent and he had increased sharp pains in his left shoulder therefore presented to the ED for further evaluation.  In the ED, EKG revealed ST elevation and a code STEMI was activated with the patient being taken emergently to the cardiac catheterization lab.  The finding was a 100% occlusion of the RCA which underwent PCI and stent placement with successful revascularization.  The patient has been admitted to the CVICU for post procedure medical management.  The patient reports that he is pain-free at this time    ACP: no ACP documentation on file the patient's wife Maty is his next of kin and default alternate decision-maker    Patient was seen and examined on 06/30/25 at 16:39 EDT .      Past Medical/Surgical/Social/Family History & Allergies     No past medical history on file.   No past surgical history on file.   Social History     Socioeconomic History    Marital status:    Tobacco Use    Smoking status: Never   Substance and Sexual Activity    Drug use: No    Sexual activity: Defer      No family history on file.   No Known Allergies   Social Drivers of Health     Tobacco Use: Unknown (2/20/2020)    Patient History     Smoking Tobacco Use: Never     Smokeless Tobacco Use: Unknown     Passive Exposure: Not on file   Alcohol Use: Not on file   Financial Resource Strain: Not on file   Food Insecurity: Not on file   Transportation Needs: Not on file   Physical Activity: Not on file   Stress: Not on file   Social Connections: Unknown (10/12/2023)    Family and Community Support     Help with Day-to-Day Activities: Not on file     Lonely or Isolated: Not on file   Interpersonal Safety: Not At Risk (6/30/2025)    Abuse Screen     Unsafe at Home or Work/School: no     Feels Threatened by Someone?: no     Does Anyone Keep You from Contacting Others or Doint Things Outside the Home?: no     Physical Sign of Abuse  Present: no   Depression: Not on file   Housing Stability: Unknown (10/12/2023)    Housing Stability     Current Living Arrangements: Not on file     Potentially Unsafe Housing Conditions: Not on file   Utilities: Not on file   Health Literacy: Unknown (10/12/2023)    Education     Help with school or training?: Not on file     Preferred Language: Not on file   Employment: Unknown (10/12/2023)    Employment     Do you want help finding or keeping work or a job?: Not on file   Disabilities: Unknown (10/12/2023)    Disabilities     Concentrating, Remembering, or Making Decisions Difficulty: Not on file     Doing Errands Independently Difficulty: Not on file        Home Medications     Prior to Admission medications    Medication Sig Start Date End Date Taking? Authorizing Provider   cephalexin (KEFLEX) 500 MG capsule Take 1 capsule by mouth 3 (Three) Times a Day. 9/1/19   Nga Butts APRN   HYDROcodone-acetaminophen (NORCO) 7.5-325 MG per tablet Take 1-2 tablets by mouth Every 6 (Six) Hours As Needed for Moderate Pain . 9/1/19   gNa uBtts APRN        Objective / Physical Exam     Vital signs:  Temp: 97.7 °F (36.5 °C)  BP: 147/93  Heart Rate: 94  Resp: 18  SpO2: 97 %  Weight: 103 kg (227 lb 15.3 oz)    Admission Weight: Weight: 103 kg (227 lb 15.3 oz)    Physical Exam  Vitals and nursing note reviewed.   Constitutional:       General: He is not in acute distress.     Appearance: Normal appearance.   HENT:      Head: Normocephalic and atraumatic.      Right Ear: External ear normal.      Left Ear: External ear normal.      Nose: Nose normal.   Eyes:      General: No scleral icterus.     Conjunctiva/sclera: Conjunctivae normal.      Pupils: Pupils are equal, round, and reactive to light.   Cardiovascular:      Heart sounds: Normal heart sounds, S1 normal and S2 normal. No murmur heard.  Pulmonary:      Effort: Pulmonary effort is normal. No respiratory distress.      Breath sounds: Normal breath sounds. No  wheezing or rhonchi.   Abdominal:      General: Bowel sounds are normal. There is no distension.      Palpations: Abdomen is soft.   Musculoskeletal:      Cervical back: Neck supple. No rigidity.      Right lower leg: No edema.      Left lower leg: No edema.      Comments: Right femoral cath site with sheath in place, no hematoma or ecchymosis   Skin:     General: Skin is warm and dry.   Neurological:      General: No focal deficit present.      Mental Status: He is alert and oriented to person, place, and time.   Psychiatric:      Comments: Calm and pleasant          Labs     Results from last 7 days   Lab Units 06/30/25  1528   WBC 10*3/mm3 9.93   HEMOGLOBIN g/dL 18.1*   HEMATOCRIT % 53.1*   PLATELETS 10*3/mm3 193      Results from last 7 days   Lab Units 06/30/25  1528   SODIUM mmol/L 138   POTASSIUM mmol/L 3.4*   CHLORIDE mmol/L 98   CO2 mmol/L 25.7   ANION GAP mmol/L 14.3   BUN mg/dL 17.5   CREATININE mg/dL 1.54*   GLUCOSE mg/dL 139*   MAGNESIUM mg/dL 2.3   ALT (SGPT) U/L 25   AST (SGOT) U/L 31   ALK PHOS U/L 71            Imaging     Chest X ray: CXR not indicated as of yet this admission    EKG: My independent evaluation showed sinus rhythm with ST elevation     Current Medications     Scheduled Meds:        Continuous Infusions:          FREDI Haney   Critical Care  06/30/25   16:39 EDT

## 2025-07-01 DIAGNOSIS — I21.11 ST ELEVATION MYOCARDIAL INFARCTION INVOLVING RIGHT CORONARY ARTERY: Primary | ICD-10-CM

## 2025-07-01 LAB
ANION GAP SERPL CALCULATED.3IONS-SCNC: 8.1 MMOL/L (ref 5–15)
ANION GAP SERPL CALCULATED.3IONS-SCNC: 9.7 MMOL/L (ref 5–15)
AORTIC DIMENSIONLESS INDEX: 0.69 (DI)
AV MEAN PRESS GRAD SYS DOP V1V2: 2 MMHG
AV VMAX SYS DOP: 94.7 CM/SEC
BH CV ECHO MEAS - ACS: 1.8 CM
BH CV ECHO MEAS - AO MAX PG: 3.6 MMHG
BH CV ECHO MEAS - AO V2 VTI: 20.4 CM
BH CV ECHO MEAS - AVA(I,D): 2.6 CM2
BH CV ECHO MEAS - EDV(CUBED): 175.6 ML
BH CV ECHO MEAS - EDV(MOD-SP4): 106 ML
BH CV ECHO MEAS - EF(MOD-SP4): 31.6 %
BH CV ECHO MEAS - ESV(CUBED): 97.3 ML
BH CV ECHO MEAS - ESV(MOD-SP4): 72.5 ML
BH CV ECHO MEAS - FS: 17.9 %
BH CV ECHO MEAS - IVS/LVPW: 1 CM
BH CV ECHO MEAS - IVSD: 1 CM
BH CV ECHO MEAS - LA DIMENSION: 3.6 CM
BH CV ECHO MEAS - LAT PEAK E' VEL: 6.4 CM/SEC
BH CV ECHO MEAS - LV DIASTOLIC VOL/BSA (35-75): 45.8 CM2
BH CV ECHO MEAS - LV MASS(C)D: 219.7 GRAMS
BH CV ECHO MEAS - LV MAX PG: 1.72 MMHG
BH CV ECHO MEAS - LV MEAN PG: 1 MMHG
BH CV ECHO MEAS - LV SYSTOLIC VOL/BSA (12-30): 31.3 CM2
BH CV ECHO MEAS - LV V1 MAX: 65.5 CM/SEC
BH CV ECHO MEAS - LV V1 VTI: 11.8 CM
BH CV ECHO MEAS - LVIDD: 5.6 CM
BH CV ECHO MEAS - LVIDS: 4.6 CM
BH CV ECHO MEAS - LVOT AREA: 4.5 CM2
BH CV ECHO MEAS - LVOT DIAM: 2.4 CM
BH CV ECHO MEAS - LVPWD: 1 CM
BH CV ECHO MEAS - MED PEAK E' VEL: 6.3 CM/SEC
BH CV ECHO MEAS - MV A MAX VEL: 61.3 CM/SEC
BH CV ECHO MEAS - MV DEC SLOPE: 286 CM/SEC2
BH CV ECHO MEAS - MV DEC TIME: 0.22 SEC
BH CV ECHO MEAS - MV E MAX VEL: 68.1 CM/SEC
BH CV ECHO MEAS - MV E/A: 1.11
BH CV ECHO MEAS - MV MAX PG: 3.2 MMHG
BH CV ECHO MEAS - MV MEAN PG: 1 MMHG
BH CV ECHO MEAS - MV P1/2T: 90.9 MSEC
BH CV ECHO MEAS - MV V2 VTI: 24.4 CM
BH CV ECHO MEAS - MVA(P1/2T): 2.42 CM2
BH CV ECHO MEAS - MVA(VTI): 2.19 CM2
BH CV ECHO MEAS - PA ACC TIME: 0.07 SEC
BH CV ECHO MEAS - PA V2 MAX: 61.6 CM/SEC
BH CV ECHO MEAS - RAP SYSTOLE: 8 MMHG
BH CV ECHO MEAS - RV MAX PG: 1.14 MMHG
BH CV ECHO MEAS - RV V1 MAX: 53.5 CM/SEC
BH CV ECHO MEAS - RV V1 VTI: 10.9 CM
BH CV ECHO MEAS - RVDD: 2.9 CM
BH CV ECHO MEAS - RVSP: 18.4 MMHG
BH CV ECHO MEAS - SV(LVOT): 53.4 ML
BH CV ECHO MEAS - SV(MOD-SP4): 33.5 ML
BH CV ECHO MEAS - SVI(LVOT): 23.1 ML/M2
BH CV ECHO MEAS - SVI(MOD-SP4): 14.5 ML/M2
BH CV ECHO MEAS - TAPSE (>1.6): 2.04 CM
BH CV ECHO MEAS - TR MAX PG: 10.4 MMHG
BH CV ECHO MEAS - TR MAX VEL: 161 CM/SEC
BH CV ECHO MEASUREMENTS AVERAGE E/E' RATIO: 10.72
BH CV XLRA - TDI S': 8.5 CM/SEC
BUN SERPL-MCNC: 12.8 MG/DL (ref 6–20)
BUN SERPL-MCNC: 13 MG/DL (ref 6–20)
BUN/CREAT SERPL: 8.4 (ref 7–25)
BUN/CREAT SERPL: 8.8 (ref 7–25)
CALCIUM SPEC-SCNC: 8.6 MG/DL (ref 8.6–10.5)
CALCIUM SPEC-SCNC: 9.1 MG/DL (ref 8.6–10.5)
CHLORIDE SERPL-SCNC: 101 MMOL/L (ref 98–107)
CHLORIDE SERPL-SCNC: 105 MMOL/L (ref 98–107)
CHOLEST SERPL-MCNC: 156 MG/DL (ref 0–200)
CO2 SERPL-SCNC: 22.3 MMOL/L (ref 22–29)
CO2 SERPL-SCNC: 26.9 MMOL/L (ref 22–29)
CREAT SERPL-MCNC: 1.45 MG/DL (ref 0.76–1.27)
CREAT SERPL-MCNC: 1.55 MG/DL (ref 0.76–1.27)
DEPRECATED RDW RBC AUTO: 49.1 FL (ref 37–54)
EGFRCR SERPLBLD CKD-EPI 2021: 52.9 ML/MIN/1.73
EGFRCR SERPLBLD CKD-EPI 2021: 57.3 ML/MIN/1.73
ERYTHROCYTE [DISTWIDTH] IN BLOOD BY AUTOMATED COUNT: 13.8 % (ref 12.3–15.4)
GLUCOSE SERPL-MCNC: 112 MG/DL (ref 65–99)
GLUCOSE SERPL-MCNC: 95 MG/DL (ref 65–99)
HBA1C MFR BLD: 5.31 % (ref 4.8–5.6)
HCT VFR BLD AUTO: 49.6 % (ref 37.5–51)
HDLC SERPL-MCNC: 33 MG/DL (ref 40–60)
HGB BLD-MCNC: 16.7 G/DL (ref 13–17.7)
LDLC SERPL CALC-MCNC: 88 MG/DL (ref 0–100)
LDLC/HDLC SERPL: 2.49 {RATIO}
LV EF BIPLANE MOD: 32 %
MCH RBC QN AUTO: 32.6 PG (ref 26.6–33)
MCHC RBC AUTO-ENTMCNC: 33.7 G/DL (ref 31.5–35.7)
MCV RBC AUTO: 96.7 FL (ref 79–97)
PLATELET # BLD AUTO: 163 10*3/MM3 (ref 140–450)
PMV BLD AUTO: 10.8 FL (ref 6–12)
POTASSIUM SERPL-SCNC: 3.8 MMOL/L (ref 3.5–5.2)
POTASSIUM SERPL-SCNC: 4.1 MMOL/L (ref 3.5–5.2)
QT INTERVAL: 362 MS
QT INTERVAL: 393 MS
QTC INTERVAL: 395 MS
QTC INTERVAL: 450 MS
RBC # BLD AUTO: 5.13 10*6/MM3 (ref 4.14–5.8)
SINUS: 3.7 CM
SODIUM SERPL-SCNC: 136 MMOL/L (ref 136–145)
SODIUM SERPL-SCNC: 137 MMOL/L (ref 136–145)
TRIGL SERPL-MCNC: 204 MG/DL (ref 0–150)
TROPONIN T SERPL HS-MCNC: 513 NG/L
TROPONIN T SERPL HS-MCNC: 605 NG/L
VLDLC SERPL-MCNC: 35 MG/DL (ref 5–40)
WBC NRBC COR # BLD AUTO: 11.57 10*3/MM3 (ref 3.4–10.8)

## 2025-07-01 PROCEDURE — 80061 LIPID PANEL: CPT | Performed by: INTERNAL MEDICINE

## 2025-07-01 PROCEDURE — 84484 ASSAY OF TROPONIN QUANT: CPT | Performed by: INTERNAL MEDICINE

## 2025-07-01 PROCEDURE — 25010000002 SULFUR HEXAFLUORIDE MICROSPH 60.7-25 MG RECONSTITUTED SUSPENSION: Performed by: INTERNAL MEDICINE

## 2025-07-01 PROCEDURE — 80048 BASIC METABOLIC PNL TOTAL CA: CPT | Performed by: INTERNAL MEDICINE

## 2025-07-01 PROCEDURE — 25010000002 ENOXAPARIN PER 10 MG: Performed by: NURSE PRACTITIONER

## 2025-07-01 PROCEDURE — 99232 SBSQ HOSP IP/OBS MODERATE 35: CPT | Performed by: INTERNAL MEDICINE

## 2025-07-01 PROCEDURE — 85027 COMPLETE CBC AUTOMATED: CPT | Performed by: INTERNAL MEDICINE

## 2025-07-01 PROCEDURE — 83036 HEMOGLOBIN GLYCOSYLATED A1C: CPT | Performed by: INTERNAL MEDICINE

## 2025-07-01 RX ORDER — CALCIUM CARBONATE 500 MG/1
2 TABLET, CHEWABLE ORAL 3 TIMES DAILY PRN
Status: DISCONTINUED | OUTPATIENT
Start: 2025-07-01 | End: 2025-07-02 | Stop reason: HOSPADM

## 2025-07-01 RX ORDER — ASPIRIN 81 MG/1
81 TABLET ORAL DAILY
Status: DISCONTINUED | OUTPATIENT
Start: 2025-07-01 | End: 2025-07-02 | Stop reason: HOSPADM

## 2025-07-01 RX ORDER — NICOTINE 21 MG/24HR
1 PATCH, TRANSDERMAL 24 HOURS TRANSDERMAL
Status: DISCONTINUED | OUTPATIENT
Start: 2025-07-01 | End: 2025-07-02 | Stop reason: HOSPADM

## 2025-07-01 RX ORDER — AMLODIPINE BESYLATE 5 MG/1
5 TABLET ORAL
Status: DISCONTINUED | OUTPATIENT
Start: 2025-07-01 | End: 2025-07-02 | Stop reason: HOSPADM

## 2025-07-01 RX ORDER — ENOXAPARIN SODIUM 100 MG/ML
40 INJECTION SUBCUTANEOUS EVERY 24 HOURS
Status: DISCONTINUED | OUTPATIENT
Start: 2025-07-01 | End: 2025-07-02 | Stop reason: HOSPADM

## 2025-07-01 RX ADMIN — NICOTINE 1 PATCH: 21 PATCH TRANSDERMAL at 08:00

## 2025-07-01 RX ADMIN — MUPIROCIN 1 APPLICATION: 20 OINTMENT TOPICAL at 08:00

## 2025-07-01 RX ADMIN — ACETAMINOPHEN 650 MG: 325 TABLET, FILM COATED ORAL at 23:01

## 2025-07-01 RX ADMIN — AMLODIPINE BESYLATE 5 MG: 5 TABLET ORAL at 14:36

## 2025-07-01 RX ADMIN — MUPIROCIN 1 APPLICATION: 20 OINTMENT TOPICAL at 20:57

## 2025-07-01 RX ADMIN — ATORVASTATIN CALCIUM 40 MG: 40 TABLET, FILM COATED ORAL at 20:57

## 2025-07-01 RX ADMIN — ASPIRIN 81 MG: 81 TABLET, COATED ORAL at 10:18

## 2025-07-01 RX ADMIN — TICAGRELOR 90 MG: 90 TABLET ORAL at 20:57

## 2025-07-01 RX ADMIN — SULFUR HEXAFLUORIDE 3 ML: KIT at 00:47

## 2025-07-01 RX ADMIN — BISOPROLOL FUMARATE 5 MG: 5 TABLET ORAL at 08:00

## 2025-07-01 RX ADMIN — ENOXAPARIN SODIUM 40 MG: 100 INJECTION SUBCUTANEOUS at 14:43

## 2025-07-01 RX ADMIN — ANTACID TABLETS 2 TABLET: 500 TABLET, CHEWABLE ORAL at 23:26

## 2025-07-01 RX ADMIN — TICAGRELOR 90 MG: 90 TABLET ORAL at 08:00

## 2025-07-01 RX ADMIN — ACETAMINOPHEN 650 MG: 325 TABLET, FILM COATED ORAL at 03:45

## 2025-07-01 NOTE — CONSULTS
"    [facility] MEDICINE SERVICE  TRANSFER OF CARE/ACCEPTANCE NOTE    PATIENT NAME: Amrik Dowell  : 1970  MRN: [id number]     Active Hospital Problems    Diagnosis  POA    **STEMI involving right coronary artery [I21.11]  Yes    Acute ST elevation myocardial infarction (STEMI) of inferior wall [I21.19]  Yes      Resolved Hospital Problems   No resolved problems to display.       Patient seen and examined by me on 25 at 3:37 PM EDT 12:37 PM EDT    Interim History:Amrik Dowell is a 55 y.o. male with PMH of erythrocytosis and tobacco abuse disorder who presented to the ED today for evaluation of chest pain. The pain had been present intermittently for 2 to 3 days which felt like a burning sensation similar to heartburn and \"felt like I just needed to belch\". He treated his symptoms with over-the-counter heartburn medications with intermittent relief. He reports associated pain in the left shoulder which was present for most of the weekend. He states that he is fairly active and felt the shoulder pain was related to some activity. He reports that his pain occurred both at rest and with activity. He denies associated shortness of air, nausea, vomiting, palpitations, headache, blurred vision or numbness/tingling of the periphery. He states that he just generally felt unwell all over. He had a couple of instances of sweating however he associated it with the heat outside. Today his chest pressure/burning pain became more persistent and he had increased sharp pains in his left shoulder therefore presented to the ED for further evaluation. In the ED, EKG revealed ST elevation and a code STEMI was activated with the patient being taken emergently to the cardiac catheterization lab. The finding was a 100% occlusion of the RCA which underwent PCI and stent placement with successful revascularization. The patient has been admitted to the CVICU for post procedure medical management. The patient reports that he " is pain-free at this time     Patient reports sleeping comfortably.,  Arouses easily, denies any chest pain, shortness of breath.    I have noted the following changes since admission: Patient underwent cardiac cath with stent to proximal and mid right RCA on 6/30/2025.  Patient's vital signs have been stable.  Patient at the time of transfer of care /82, heart rate 61, temp 98, respiratory rate 14, 99% on room air.  Patient has requested support to stop smoking.  Will order nicotine patch.    I have reviewed the H&P, diagnostic data and plan of care for Amrik Dowell.  I will be taking over care of this patient during the current hospitalization.        Signature: Electronically signed by FREDI Pate, 07/01/25, 15:37 EDT.  [facility] Hospitalist Team

## 2025-07-01 NOTE — PHARMACY PATIENT ASSISTANCE
Transitions of Care (test claim):    Drug Brilinta: Generic price reflected below  Covered/PA Required: Covered without PA  Copay Per Month: $15  Is the medication eligible for a trial card/copay card? N/A    Please note that when it states medication is eligible for a trial card that this only means that the medication has a free trial available. This does not assess if the patient has already utilized the once in a lifetime free trial.     This test claim coverage is only valid on the date the note is published. Copay/coverage could vary depending on patient coverage at a later date.  Additionally this test claim is for the sole purpose of a price check not a clinical recommendation.     For billing questions please call [facility] Pharmacist at ext: [phone]  For M2B questions please call [facility] at ext: [phone]    Vidya Bacon PharmD

## 2025-07-01 NOTE — PROGRESS NOTES
Cardiology Progress Note    Patient Identification:  Name: Amrik Dowell  Age: 55 y.o.  Sex: male  :  1970  MRN: [id number]                 Follow Up / Chief Complaint: STEMI   Chief Complaint   Patient presents with    Chest Pain       Interval History:  Patient presented with chest pain and was found to have STEMI, patient underwent cardiac cath with stent to proximal and mid RCA     NP NOTE:  Patient was seen and examined.  Patient denies any chest pain discomfort.  He reports having back pain last evening and did not sleep well.  His troponin continues to up-trend.  Will repeat troponin this afternoon and in the morning.    Continue aspirin, brilinta statin, beta blocker     Right groin without any hematoma     Echocardiogram pending     Electronically signed by Padmini Ryan, FREDI, 25, 3:14 PM EDT.    Cardiology attending addendum :    I have personally performed a face-to-face diagnostic evaluation, physical exam and reviewed data on this patient.  I have reviewed documentation done by me and nurse practitioner  and corrected as needed.  And agree with the different components of documentation.Greater than 50% of the time spent in the care of this patient was provided by attending consultant/me.       Subjective: Patient seen and examined; chart and labs reviewed; discussed with bedside nurse.  Patient's creatinine is elevated.  Troponin is still trending up.        Objective: HS Troponin 284--377--605--513   2025: potassium 3.8, bun 13 creatinine 1.55 A1C 5.31 HDL 33 LDL 88 trigs 204  WBC 11.57     History of present illness:    Mr. Amrik Dowell has PMH of     Hypertension  Erythrocytosis/polycythemia  Positive family history of premature CAD in grandfather  Cigarette smoker     Presented through emergency room with complaint of chest pain.  Patient has been having chest pain for over a day now which is burning and type radiating to the left  shoulder, thought it was acid reflux worse  therefore came into the hospital.  Was found to have ST elevation inferiorly therefore interventional cardiology was consulted.        Assessment:  :     Acute inferior wall STEMI  CAD  Hypertension  Dyslipidemia  Elevated creatinine  Positive family history  Cigarette smoker        Recommendations / Plan:        Patient presented 6/30/2025 through emergency room with complaint of chest pain had ST elevation was taken to Cath Lab for primary PCI.  Patient's LVEDP was low.  Patient was given Integrilin aspirin and Brilinta  Patient underwent PCI to RCA.  Will discontinue Integrilin.  Troponin is trending up.  Will check serial troponins.  Will hydrate patient and monitor BUN/creatinine.  Patient is currently chest pain..  Increase activity as tolerated.  Monitor blood pressure and titrate medication as needed.  Continue medical management with aspirin, Brilinta, amlodipine, atorvastatin, bisoprolol as tolerated.  Monitor renal function, hemodynamics, rhythm closely.                Copied text in this portion of the note has been reviewed and is accurate as of 7/1/2025    Past Medical History:  History reviewed. No pertinent past medical history.  Past Surgical History:  Past Surgical History:   Procedure Laterality Date    CARDIAC CATHETERIZATION N/A 6/30/2025    Procedure: Left Heart Cath;  Surgeon: Warner Zaidi MD;  Location: [facility] CATH INVASIVE LOCATION;  Service: Cardiology;  Laterality: N/A;    CARDIAC CATHETERIZATION N/A 6/30/2025    Procedure: Percutaneous Coronary Intervention;  Surgeon: Warner Zaidi MD;  Location: [facility] CATH INVASIVE LOCATION;  Service: Cardiology;  Laterality: N/A;  RCA    CARDIAC CATHETERIZATION N/A 6/30/2025    Procedure: Stent CLARY coronary;  Surgeon: Warner Zaidi MD;  Location: [facility] CATH INVASIVE LOCATION;  Service: Cardiology;  Laterality: N/A;  RCA        Social History:   Social History     Tobacco Use    Smoking status: Every Day     Types:  "Cigarettes    Smokeless tobacco: Not on file   Substance Use Topics    Alcohol use: Yes      Family History:  History reviewed. No pertinent family history.       Allergies:  No Known Allergies  Scheduled Meds:  amLODIPine, 5 mg, Q24H  aspirin, 81 mg, Daily  atorvastatin, 40 mg, Nightly  bisoprolol, 5 mg, Q24H  enoxaparin sodium, 40 mg, Q24H  mupirocin, 1 Application, BID  nicotine, 1 patch, Q24H  ticagrelor, 90 mg, BID          Review of Systems:   ROS  Review of Systems   Constitution: Negative for chills and fever.   Cardiovascular: Negative for chest pain and palpitations.   Respiratory: Negative for cough and hemoptysis.    Gastrointestinal: Negative for nausea.        Constitutional:  Temp:  [97.7 °F (36.5 °C)-98.2 °F (36.8 °C)] 98 °F (36.7 °C)  Heart Rate:  [59-92] 61  Resp:  [11-20] 15  BP: (119-146)/(72-95) 128/74    Physical Exam   /74   Pulse 61   Temp 98 °F (36.7 °C) (Oral)   Resp 15   Ht 190.5 cm (75\")   Wt 103 kg (227 lb)   SpO2 99%   BMI 28.37 kg/m²   General:  Appears in no acute distress  Eyes: Sclera is anicteric,  conjunctiva is clear   HEENT:  No JVD. Thyroid not visibly enlarged. No mucosal pallor or cyanosis  Respiratory: Respirations regular and unlabored at rest.  Clear to auscultation  Cardiovascular: S1,S2 Regular rate and rhythm.  Gastrointestinal: Abdomen nondistended.  Musculoskeletal:  No abnormal movements  Extremities: No digital clubbing or cyanosis  Skin: Color pink.   Neuro: Alert and awake.    INTAKE AND OUTPUT:    Intake/Output Summary (Last 24 hours) at 7/1/2025 1751  Last data filed at 7/1/2025 1600  Gross per 24 hour   Intake 720 ml   Output 1075 ml   Net -355 ml       Cardiographics  Telemetry: Reviewed and interpreted by me reveals sinus rhythm    ECG:   Telemetry Scan   Final Result      Telemetry Scan   Final Result      Telemetry Scan   Final Result      Telemetry Scan   Final Result      Telemetry Scan   Final Result      ECG 12 Lead Rhythm Change   Final " Result   HEART RATE=92  bpm   RR Pzsmojxk=425  ms   OH Wnlvsnyq=940  ms   P Horizontal Axis=-3  deg   P Front Axis=66  deg   QRSD Wicwilsw=480  ms   QT Enketjnu=207  ms   ETaW=786  ms   QRS Axis=-16  deg   T Wave Axis=-29  deg   - ABNORMAL ECG -   Sinus rhythm   Ventricular premature complex   Evolving inferior infarct since 30-Jun-2025 15:16:35   ST elevation, consider anterolateral injury   When compared with ECG of 30-Jun-2025 15:16:35,   Significant rate increase   New or worsened ischemia or infarction   Electronically Signed By: Guzman Major ([facility]) 2025-07-01 08:01:38   Date and Time of Study:2025-06-30 17:32:55      Telemetry Scan   Final Result      ECG 12 Lead Chest Pain   Final Result   HEART RATE=61  bpm   RR Dhsokmve=085  ms   OH Zicllgaw=243  ms   P Horizontal Axis=0  deg   P Front Axis=57  deg   QRSD Interval=92  ms   QT Vkgtvmhm=760  ms   SOfI=404  ms   QRS Axis=58  deg   T Wave Axis=81  deg   - ABNORMAL ECG -   Sinus rhythm   Inferior  infarct, acute (RCA)   ST elevation, consider lateral injury   When compared with ECG of 07-Feb-2018 15:25:08,   Significant rate decrease   New or worsened ischemia or infarction   Electronically Signed By: Storm Ndiaye ([facility]) 2025-07-01 06:43:36   Date and Time of Study:2025-06-30 15:16:35      ECG 12 Lead Chest Pain    (Results Pending)   ECG 12 Lead Rhythm Change    (Results Pending)     I have personally reviewed EKG    Echocardiogram: Results for orders placed during the hospital encounter of 06/30/25    Echocardiogram 2D complete 07/01/2025 12:58 PM    Interpretation Summary    Left ventricular ejection fraction appears to be 36 - 40%.    The following left ventricular wall segments are hypokinetic: Inferior and inferior septal    Left ventricular diastolic function is consistent with (grade II w/high LAP) pseudonormalization.    Estimated right ventricular systolic pressure from tricuspid regurgitation is normal (<35 mmHg).    IVC is dilated.  2.3 cm.      STRESS  TEST      HEART CATHETERIZATION  Results for orders placed during the hospital encounter of 25    Cardiac Catheterization/Vascular Study    Conclusion  CARDIAC CATHETERIZATION PCI REPORT      NAME:              Amrik Dowell  :                1970  AGE/SEX:        55 y.o. male  MRN:                [id number]    2025     : Warner Zaidi.    DATE OF PROCEDURE: 2025    INDICATION FOR PROCEDURE: Acute ST elevation inferior myocardial infarction    PROCEDURE PERFORMED:  1.  Left heart cath, coronary arteriogram and left ventriculogram  2.  PTCA and drug-eluting stent to proximal and mid RCA        Procedure Description :    The patient underwent successful [x]  Left  []  Right  []  Left & Right  Heart catheterization and coronary angiography via the  [x]  Femoral approach  []  Radial approach  []  Brachial approach    Procedure Narrative:    After informed consent, under conscious sedation,  under conscious sedation, given and monitored by me,moderate conscious sedation was given utilizing IV Versed and fentanyl administered by RN with continuous EKG oximetry and hemodynamic monitoring supervised by me throughout the entire case.  I was present with the patient for the duration of moderate sedation and supervised staff who had no other duties and monitored the patient for the entire procedure patient had Bullard 2-3 sedation scale .  And local anesthesia a 6 Swedish sheath was placed in right femoral artery.  6 Swedish hockey-stick guide was used to engage the right coronary ostium and obtain guiding shots.  This revealed that right coronary artery was the culprit vessel with hazy thrombotic lesion in the majority of vertical segment of proximal and mid RCA then in the distal portion of the vertical segment there was a 99% stenosis with haziness consistent with thrombus.  Therefore after giving heparin and Integrilin intravenously a run-through wire was used to cross the lesion it  was predilated.  The distal lesion was stented with 4 x 18 Xience and the proximal and mid lesion was stented with 4.5 x 28 Xience CLARY with reduction of lesion to 0 with this KARSTEN-3 flow.  Then JL 4 diagnostic was used to do left 3 arteriogram.  Pigtail was used to do LV measurements and hand-injection LV gram.  Patient tolerated procedure well.  Complications none.      FINDINGS:    Hemodynamic :  1. Left ventricular pressure: 125/6  LVEDP:6  2. Aortic pressure: 118/66    AV Gradient:  no      LEFT VENTRICULOGRAPHY:    Wall Motion: Inferior wall hypokinesis  Mitral regurgitation: Difficult to see in the send ejection  Estimated EF : Difficult evaluating the send ejection    Coronary Arteriography: (Please Code highest degree of stenosis)  Dominance:  []  Left  [x]  Right  []  Co-Dominant    Left Main %: Is without disease    Proximal LAD %: 30% calcified proximal and mid narrowing  Mid/Distal LAD %: There are 2 large diagonals in midportion without disease    LCX %: Bap Gondi to high marginal which looks like ramus and distal circumflex continues in distal area and divides into 2 branches.  First marginal has calcified 30 to 40% proximal narrowing.      RCA %: 99% mid was subjected to drug-eluting stents with 4.5 x 28 and 4x18 Xience CLARY with reduction of lesion to 0 with brisk KARSTEN-3 flow    CORONARY INTERVENTION FINDINGS:  PCI Procedure Notes    Segment # proximal to midRCA  Culprit Lesion:  [x]  Yes  []  No  % Pre-Stenosis: 99% with haziness consistent with thrombus  Pre-Proc TIMIFlow: 1  % Post-Stenosis: 0  Post-Proc TIMIFlow: 3  Non-High/Non-C:                          High/C:yes  Lesion Length (mm):  Primary Device Used: 4.5 x 28 and 4 x 18 Xience CLARY      Conscious Sedation:   [x]  Yes   []  No  No Flow Phenom:       []  Yes  [x]  No  Dissection:  []  Yes  [x]  No  Acute Closure: []  Yes  [x]  No  Perforation:  []  Yes  [x]  No    Complications:  none  Estimated Blood Loss:  5cc.      Impression  :    Successful drug-eluting stent to proximal and mid RCA    Recommendation:    Routine STEMI care  Admit to CVCU  Integrilin for 12 hours, monitor for toxicities.  Dual antiplatelet therapy with aspirin and Brilinta as tolerated  Statins and beta-blockers as tolerated  Serial troponins  Patient's renal function came back elevated.  Will hydrate per protocol with 3 mL/kg for the first 4 hours then cut the fluid down to 50 cc an hour for 24 hours.  Monitor renal function.  Check echocardiogram.  Telemetry to monitor rhythm.  Discussed with intensivist nurse practitioner Beatriz      Pre-Procedure Notes  H&P Performed  [x]  Yes []  No       []  N/A    Indications:  []  ACS <= 24 HRS  []  ACS >24 HRS  []  New Onset Angina <= 2 mos  []  Worsening Angina  []  Resuscitated Cardiac Arrest  []  Angina on Exertion:  []  Suspected CAD  []  Valvular Disease  []  Pericardial Disease  []  Cardiac Arrythmia  []  Cardiomyopathy  []  LV Dysfunction  []  Syncope  []  Post Cardiac Transplant  []  Eval. For Exercise Clearance  Non-Vascular    Risks, Benefits, & Complications Discussed:  [x]  Yes  []  No  []  N/A    Questions Answered:  [x]  Yes  []  No  []  N/A    Consent Obtained:  [x]  Yes  []  No  []  N/A    CHF: []  Yes  [x]  No  If Yes:  Newly Diagnosed?  []  Yes  []  No  If Yes:  HF Type:  []  Diastolic  []  Systolic  []  Unknown  Electronically signed by Warner Zaidi MD, 06/30/25, 8:21 PM EDT.      Lab Review   I have reviewed the labs  Results from last 7 days   Lab Units 07/01/25  1436 07/01/25  0349 06/30/25  1756   HSTROP T ng/L 513* 605* 377*     Results from last 7 days   Lab Units 06/30/25  1528   MAGNESIUM mg/dL 2.3     Results from last 7 days   Lab Units 07/01/25  1436   SODIUM mmol/L 137   POTASSIUM mmol/L 4.1   BUN mg/dL 12.8   CREATININE mg/dL 1.45*   CALCIUM mg/dL 8.6         Results from last 7 days   Lab Units 07/01/25  0349 06/30/25  1528   WBC 10*3/mm3 11.57* 9.93   HEMOGLOBIN g/dL 16.7 18.1*  "  HEMATOCRIT % 49.6 53.1*   PLATELETS 10*3/mm3 163 193     Results from last 7 days   Lab Units 06/30/25  1528   INR  1.03       RADIOLOGY:  Imaging Results (Last 24 Hours)       ** No results found for the last 24 hours. **                  )7/1/2025  Warner Zaidi MD      [facility] [place]/Transcription:   \"Dictated utilizing Dragon dictation\".   "

## 2025-07-01 NOTE — PROGRESS NOTES
"Critical Care Progress Note     Amrik Dowell : 1970 MRN:[id number] LOS:1  Rm: 3113/1     Principal Problem: STEMI involving right coronary artery     Reason for follow up: All the medical problems listed below    Summary     Amrik Dowell is a 55 y.o. male with PMH of erythrocytosis and tobacco abuse disorder who presented to the ED today for evaluation of chest pain.  The pain had been present intermittently for 2 to 3 days which felt like a burning sensation similar to heartburn and \"felt like I just needed to belch\".  He treated his symptoms with over-the-counter heartburn medications with intermittent relief.  He reports associated pain in the left shoulder which was present for most of the weekend.  He states that he is fairly active and felt the shoulder pain was related to some activity.  He reports that his pain occurred both at rest and with activity.  He denies associated shortness of air, nausea, vomiting, palpitations, headache, blurred vision or numbness/tingling of the periphery.  He states that he just generally felt unwell all over.  He had a couple of instances of sweating however he associated it with the heat outside.  Today his chest pressure/burning pain became more persistent and he had increased sharp pains in his left shoulder therefore presented to the ED for further evaluation.  In the ED, EKG revealed ST elevation and a code STEMI was activated with the patient being taken emergently to the cardiac catheterization lab.  The finding was a 100% occlusion of the RCA which underwent PCI and stent placement with successful revascularization.  The patient has been admitted to the CVICU for post procedure medical management.  The patient reports that he is pain-free at this time     ACP: no ACP documentation on file the patient's wife Maty is his next of kin and default alternate decision-maker    Patient is on Hospital Day: 2.    Significant Events / Subjective     25 : The " patient had an uneventful night. He is on room air with no SOA. He denies having any chest pain. Stable for transfer out of the ICU    Assessment / Plan     STEMI  Coronary artery disease involving native coronary artery  New diagnosis  HS Troponin 284, monitor reflex.  Cardiac catheterization 6/30/2025 with PCI and stents to the culprit lesion, RCA  c/w Brilinta 90 twice daily, baby aspirin, statins and beta-blockers as tolerated.      Erythrocytosis/polycythemia  Per patient report he has serial monitoring and his PCP instructed him when to go donate blood  Hemoglobin 18.1, hematocrit 53.1  Recommend follow-up with PCP, defer to discharging provider     Tobacco abuse disorder  Smokes 2 packs cigarettes per day  Started smoking at 15 years of age (41 years)  Strongly encouraged to stop smoking for cardiac health and general lifestyle  The patient reports he is ready to attempt quitting     Putative significant EMILIANO  Was supposed to have PSG previously, but didn't keep appt  Has significant HR variability at night, most c/w this  Has multiple sx's suggestive   Pt encouraged to get PSG after d/c      Disposition:  PCU    Code status:   Code Status (Patient has no pulse and is not breathing): CPR (Attempt to Resuscitate)  Medical Interventions (Patient has pulse or is breathing): Full Support  Level Of Support Discussed With: Patient       Nutrition:   Diet: Cardiac, Diabetic; Healthy Heart (2-3 Na+); Consistent Carbohydrate; Fluid Consistency: Thin (IDDSI 0)   Patient isn't on Tube Feeding     VTE Prophylaxis:  Pharmacologic VTE prophylaxis orders are present.           Objective / Physical Exam     Vital signs:  Temp: 98.2 °F (36.8 °C)  BP: 136/72  Heart Rate: 71  Resp: 13  SpO2: 99 %  Weight: 103 kg (227 lb)    Admission Weight: Weight: 103 kg (227 lb 15.3 oz)  Current Weight: Weight: 103 kg (227 lb)    Input/Output in last 24 hours:    Intake/Output Summary (Last 24 hours) at 7/1/2025 1042  Last data filed at  7/1/2025 0800  Gross per 24 hour   Intake 480 ml   Output 1075 ml   Net -595 ml      Net IO Since Admission: -595 mL [07/01/25 1042]     Physical Exam  Constitutional:       General: He is not in acute distress.     Appearance: Normal appearance.   HENT:      Head: Normocephalic and atraumatic.      Right Ear: External ear normal.      Left Ear: External ear normal.      Mouth/Throat:      Mouth: Mucous membranes are moist.   Eyes:      Conjunctiva/sclera: Conjunctivae normal.      Pupils: Pupils are equal, round, and reactive to light.   Cardiovascular:      Heart sounds: Normal heart sounds, S1 normal and S2 normal. No murmur heard.  Pulmonary:      Breath sounds: Normal breath sounds. No wheezing or rhonchi.   Abdominal:      General: Bowel sounds are normal. There is no distension.      Palpations: Abdomen is soft.      Tenderness: There is no abdominal tenderness.   Musculoskeletal:      Comments: Right femoral procedure site with no hematoma or ecchymosis    Skin:     General: Skin is warm and dry.   Neurological:      General: No focal deficit present.      Mental Status: He is alert and oriented to person, place, and time.          Radiology and Labs     Results from last 7 days   Lab Units 07/01/25  0349 06/30/25  1528   WBC 10*3/mm3 11.57* 9.93   HEMOGLOBIN g/dL 16.7 18.1*   HEMATOCRIT % 49.6 53.1*   PLATELETS 10*3/mm3 163 193      Results from last 7 days   Lab Units 06/30/25  1528   PROTIME Seconds 13.4   INR  1.03      Results from last 7 days   Lab Units 07/01/25  0349 06/30/25  1528   SODIUM mmol/L 136 138   POTASSIUM mmol/L 3.8 3.4*   CHLORIDE mmol/L 101 98   CO2 mmol/L 26.9 25.7   ANION GAP mmol/L 8.1 14.3   BUN mg/dL 13.0 17.5   CREATININE mg/dL 1.55* 1.54*   GLUCOSE mg/dL 112* 139*   MAGNESIUM mg/dL  --  2.3   ALT (SGPT) U/L  --  25   AST (SGOT) U/L  --  31   ALK PHOS U/L  --  71      Results from last 7 days   Lab Units 06/30/25  1528   ALT (SGPT) U/L 25   AST (SGOT) U/L 31   ALK PHOS U/L 71            No radiology results for the last day    Current medications     Scheduled Meds:   aspirin, 81 mg, Oral, Daily  atorvastatin, 40 mg, Oral, Nightly  bisoprolol, 5 mg, Oral, Q24H  enoxaparin sodium, 40 mg, Subcutaneous, Q24H  mupirocin, 1 Application, Each Nare, BID  nicotine, 1 patch, Transdermal, Q24H  ticagrelor, 90 mg, Oral, BID        Continuous Infusions:   sodium chloride, 50 mL/hr, Last Rate: 50 mL/hr (07/01/25 0701)          Plan discussed with RN. Reviewed all other data in the last 24 hours, including but not limited to vitals, labs, microbiology, imaging and pertinent notes from other providers.     FREDI Haney   Critical Care  07/01/25   10:42 EDT

## 2025-07-01 NOTE — PAYOR COMM NOTE
"Amrik Dowell (55 y.o. Male)       Date of Birth   1970    Social Security Number       [address] [place] [facility]    Home Phone   [phone]    MRN   [id number]       Mandaeism   None    Marital Status                               Admission Date   2025    Admission Type   Emergency    Admitting Provider   Warner Zaidi MD    Attending Provider   Jerome Shrestha DO    Department, Room/Bed   [facility] CARDIOVASCULAR CARE UNIT, 3113/1       Discharge Date       Discharge Disposition       Discharge Destination                                 Attending Provider: Jerome Shrestha DO    Allergies: No Known Allergies    Isolation: None   Infection: None   Code Status: CPR    Ht: 190.5 cm (75\")   Wt: 103 kg (227 lb)    Admission Cmt: None   Principal Problem: STEMI involving right coronary artery [I21.11]                   Active Insurance as of 2025       Primary Coverage       Payor Plan Insurance Group Employer/Plan Group    [facility] [id number]       Payor Plan Address Payor Plan Phone Number Payor Plan Fax Number Effective Dates    [address] [address] [phone]  2022 - None Entered    [facility] [id number]         Subscriber Name Subscriber Birth Date Member ID       Tabitha Dowell  [id number]                     Emergency Contacts        (Rel.) Home Phone Work Phone Mobile Phone    DEONTE DOWELL (Spouse) -- -- [phone]                 History & Physical        Beatriz Jackson APRN at 25 1635            Critical Care History and Physical     Amrik Dowell : 1970 MRN:[id number] LOS:0 ROOM: Pool/Cath     Reason for admission: STEMI involving right coronary artery     Assessment / Plan     STEMI  Coronary artery disease involving native coronary artery  New diagnosis  HS Troponin 284, monitor reflex.  Cardiac catheterization 2025 with PCI and stents to the culprit lesion, RCA  c/w aspirin, beta " "blockers and statins.    Erythrocytosis/polycythemia  Per patient report he has serial monitoring and his PCP instructed him when to go donate blood  Hemoglobin 18.1, hematocrit 53.1  Recommend follow-up with PCP, defer to discharging provider    Tobacco abuse disorder  Smokes 2 packs cigarettes per day  Started smoking at 15 years of age (41 years)  Strongly encouraged to stop smoking for cardiac health and general lifestyle  The patient reports he is ready to attempt quitting          Nutrition:   NPO Diet NPO Type: Strict NPO     VTE Prophylaxis:  No VTE prophylaxis order currently exists.         History of Present illness     Amrik Dowell is a 55 y.o. male with PMH of erythrocytosis and tobacco abuse disorder who presented to the ED today for evaluation of chest pain.  The pain had been present intermittently for 2 to 3 days which felt like a burning sensation similar to heartburn and \"felt like I just needed to belch\".  He treated his symptoms with over-the-counter heartburn medications with intermittent relief.  He reports associated pain in the left shoulder which was present for most of the weekend.  He states that he is fairly active and felt the shoulder pain was related to some activity.  He reports that his pain occurred both at rest and with activity.  He denies associated shortness of air, nausea, vomiting, palpitations, headache, blurred vision or numbness/tingling of the periphery.  He states that he just generally felt unwell all over.  He had a couple of instances of sweating however he associated it with the heat outside.  Today his chest pressure/burning pain became more persistent and he had increased sharp pains in his left shoulder therefore presented to the ED for further evaluation.  In the ED, EKG revealed ST elevation and a code STEMI was activated with the patient being taken emergently to the cardiac catheterization lab.  The finding was a 100% occlusion of the RCA which underwent PCI " and stent placement with successful revascularization.  The patient has been admitted to the CVICU for post procedure medical management.  The patient reports that he is pain-free at this time    ACP: no ACP documentation on file the patient's wife Maty is his next of kin and default alternate decision-maker    Patient was seen and examined on 06/30/25 at 16:39 EDT .      Past Medical/Surgical/Social/Family History & Allergies     No past medical history on file.   No past surgical history on file.   Social History     Socioeconomic History    Marital status:    Tobacco Use    Smoking status: Never   Substance and Sexual Activity    Drug use: No    Sexual activity: Defer      No family history on file.   No Known Allergies   Social Drivers of Health     Tobacco Use: Unknown (2/20/2020)    Patient History     Smoking Tobacco Use: Never     Smokeless Tobacco Use: Unknown     Passive Exposure: Not on file   Alcohol Use: Not on file   Financial Resource Strain: Not on file   Food Insecurity: Not on file   Transportation Needs: Not on file   Physical Activity: Not on file   Stress: Not on file   Social Connections: Unknown (10/12/2023)    Family and Community Support     Help with Day-to-Day Activities: Not on file     Lonely or Isolated: Not on file   Interpersonal Safety: Not At Risk (6/30/2025)    Abuse Screen     Unsafe at Home or Work/School: no     Feels Threatened by Someone?: no     Does Anyone Keep You from Contacting Others or Doint Things Outside the Home?: no     Physical Sign of Abuse Present: no   Depression: Not on file   Housing Stability: Unknown (10/12/2023)    Housing Stability     Current Living Arrangements: Not on file     Potentially Unsafe Housing Conditions: Not on file   Utilities: Not on file   Health Literacy: Unknown (10/12/2023)    Education     Help with school or training?: Not on file     Preferred Language: Not on file   Employment: Unknown (10/12/2023)    Employment     Do you  want help finding or keeping work or a job?: Not on file   Disabilities: Unknown (10/12/2023)    Disabilities     Concentrating, Remembering, or Making Decisions Difficulty: Not on file     Doing Errands Independently Difficulty: Not on file        Home Medications     Prior to Admission medications    Medication Sig Start Date End Date Taking? Authorizing Provider   cephalexin (KEFLEX) 500 MG capsule Take 1 capsule by mouth 3 (Three) Times a Day. 9/1/19   Nga Butts APRN   HYDROcodone-acetaminophen (NORCO) 7.5-325 MG per tablet Take 1-2 tablets by mouth Every 6 (Six) Hours As Needed for Moderate Pain . 9/1/19   Nga Butts APRN        Objective / Physical Exam     Vital signs:  Temp: 97.7 °F (36.5 °C)  BP: 147/93  Heart Rate: 94  Resp: 18  SpO2: 97 %  Weight: 103 kg (227 lb 15.3 oz)    Admission Weight: Weight: 103 kg (227 lb 15.3 oz)    Physical Exam  Vitals and nursing note reviewed.   Constitutional:       General: He is not in acute distress.     Appearance: Normal appearance.   HENT:      Head: Normocephalic and atraumatic.      Right Ear: External ear normal.      Left Ear: External ear normal.      Nose: Nose normal.   Eyes:      General: No scleral icterus.     Conjunctiva/sclera: Conjunctivae normal.      Pupils: Pupils are equal, round, and reactive to light.   Cardiovascular:      Heart sounds: Normal heart sounds, S1 normal and S2 normal. No murmur heard.  Pulmonary:      Effort: Pulmonary effort is normal. No respiratory distress.      Breath sounds: Normal breath sounds. No wheezing or rhonchi.   Abdominal:      General: Bowel sounds are normal. There is no distension.      Palpations: Abdomen is soft.   Musculoskeletal:      Cervical back: Neck supple. No rigidity.      Right lower leg: No edema.      Left lower leg: No edema.      Comments: Right femoral cath site with sheath in place, no hematoma or ecchymosis   Skin:     General: Skin is warm and dry.   Neurological:      General: No  focal deficit present.      Mental Status: He is alert and oriented to person, place, and time.   Psychiatric:      Comments: Calm and pleasant          Labs     Results from last 7 days   Lab Units 06/30/25  1528   WBC 10*3/mm3 9.93   HEMOGLOBIN g/dL 18.1*   HEMATOCRIT % 53.1*   PLATELETS 10*3/mm3 193      Results from last 7 days   Lab Units 06/30/25  1528   SODIUM mmol/L 138   POTASSIUM mmol/L 3.4*   CHLORIDE mmol/L 98   CO2 mmol/L 25.7   ANION GAP mmol/L 14.3   BUN mg/dL 17.5   CREATININE mg/dL 1.54*   GLUCOSE mg/dL 139*   MAGNESIUM mg/dL 2.3   ALT (SGPT) U/L 25   AST (SGOT) U/L 31   ALK PHOS U/L 71            Imaging     Chest X ray: CXR not indicated as of yet this admission    EKG: My independent evaluation showed sinus rhythm with ST elevation     Current Medications     Scheduled Meds:        Continuous Infusions:          FREDI Haney   Critical Care  06/30/25   16:39 EDT     Electronically signed by Beatriz Jackson APRN at 06/30/25 1804          Emergency Department Notes        Storm Ndiaye MD at 06/30/25 1524          Subjective   History of Present Illness  Chief complaint: Chest pain    55-year-old male presents with chest pain.  Patient states he has had pain intermittently over the past couple days but it became worse this morning.  He describes as a heaviness on his chest that radiates to his left shoulder.  He denies any significant shortness of breath.  He has had no diaphoresis or nausea.  He denies any alleviating or exacerbating factors.    History provided by:  Patient      Review of Systems   Constitutional:  Negative for fever.   HENT:  Negative for congestion.    Respiratory:  Negative for cough and shortness of breath.    Cardiovascular:  Positive for chest pain.   Gastrointestinal:  Negative for abdominal pain and vomiting.   Musculoskeletal:  Negative for back pain.   Neurological:  Negative for headaches.   Psychiatric/Behavioral:  Negative for confusion.        No past medical  "history on file.    No Known Allergies    No past surgical history on file.    No family history on file.    Social History     Socioeconomic History    Marital status:    Tobacco Use    Smoking status: Never   Substance and Sexual Activity    Drug use: No    Sexual activity: Defer       /77   Pulse 80   Ht 190.5 cm (75\")   Wt 103 kg (227 lb 15.3 oz)   SpO2 97%   BMI 28.49 kg/m²       Objective   Physical Exam  Vitals and nursing note reviewed.   Constitutional:       Appearance: He is ill-appearing.   HENT:      Head: Normocephalic and atraumatic.   Cardiovascular:      Rate and Rhythm: Normal rate and regular rhythm.      Heart sounds: Normal heart sounds.   Pulmonary:      Effort: Pulmonary effort is normal. No respiratory distress.      Breath sounds: Normal breath sounds.   Abdominal:      Palpations: Abdomen is soft.      Tenderness: There is no abdominal tenderness.   Musculoskeletal:      Right lower leg: No tenderness. No edema.      Left lower leg: No tenderness. No edema.   Skin:     General: Skin is warm and dry.   Neurological:      Mental Status: He is alert and oriented to person, place, and time.         Procedures          ED Course      My interpretation of EKG shows sinus rhythm, rate of 61, ST elevation inferiorly with reciprocal changes consistent with acute inferior STEMI                                                 Medical Decision Making  Problems Addressed:  Acute ST elevation myocardial infarction (STEMI) involving other coronary artery of inferior wall: complicated acute illness or injury    Amount and/or Complexity of Data Reviewed  Labs: ordered.  Radiology: ordered.  ECG/medicine tests: ordered.    Risk  Prescription drug management.      Initial EKG showed inferior STEMI.  STEMI protocol was initiated.  I discussed with Dr. Zaidi with cardiology who will take the patient to the Cath Lab.  Patient remained hemodynamically stable throughout his short ER " stay      Final diagnoses:   Acute ST elevation myocardial infarction (STEMI) involving other coronary artery of inferior wall       ED Disposition  ED Disposition       ED Disposition   Send to Cath Lab    Condition   --    Comment   --               No follow-up provider specified.       Medication List      No changes were made to your prescriptions during this visit.            Storm Ndiaye MD  06/30/25 1529      Electronically signed by Storm Ndiaye MD at 06/30/25 1529       Vital Signs (last 2 days)       Date/Time Temp Temp src Pulse Resp BP Patient Position SpO2    07/01/25 0809 98.2 (36.8) Oral -- 13 136/72 -- 99    07/01/25 0800 -- -- 71 -- 136/72 -- --    07/01/25 0751 -- -- -- -- -- -- 99    07/01/25 0300 98.2 (36.8) Oral -- 20 125/83 -- --    07/01/25 0100 -- -- 66 -- 128/78 -- 97    07/01/25 0001 -- -- 62 -- 126/82 -- 100    06/30/25 2300 97.7 (36.5) Oral 59 17 126/80 -- 97    06/30/25 2230 -- -- 59 -- 126/92 -- 96    06/30/25 2200 -- -- 62 -- 120/78 -- 97    06/30/25 2130 -- -- 62 -- 122/78 -- 97    06/30/25 2055 -- -- 65 -- 119/80 -- 96    06/30/25 2050 -- -- 65 -- 124/82 -- 98    06/30/25 2045 -- -- 65 -- 127/84 -- 98    06/30/25 2040 -- -- 64 -- 121/84 -- 98    06/30/25 2035 -- -- 66 -- 129/74 -- 99    06/30/25 2030 -- -- 62 -- 124/87 -- 98    06/30/25 2025 -- -- 67 -- 127/93 -- 97    06/30/25 2020 -- -- 70 -- 133/91 -- 98    06/30/25 2015 -- -- 70 -- 131/89 -- 98    06/30/25 2010 -- -- 72 -- 133/94 -- 98 06/30/25 2005 -- -- 82 -- 125/88 -- 99 06/30/25 2000 -- -- 77 -- 124/88 -- 97    06/30/25 1900 97.8 (36.6) Oral -- 11 -- -- --    06/30/25 1845 -- -- 79 -- 146/87 -- 96    06/30/25 1836 -- -- 85 -- 136/95 -- --    06/30/25 1830 -- -- 92 -- 136/95 -- 98    06/30/25 1815 -- -- 89 -- 136/87 -- 98    06/30/25 1800 97.8 (36.6) Oral 71 -- 129/84 -- 99 06/30/25 1745 -- -- 91 -- 131/87 -- 99 06/30/25 1730 -- -- 90 -- 143/88 -- 99    06/30/25 1715 -- -- 85 -- 129/85 -- 97 06/30/25 1700  -- -- 87 -- 143/91 -- 99    06/30/25 1645 -- -- 87 -- 143/88 -- 97    06/30/25 1634 97.7 (36.5) Oral 94 18 147/93 Lying 97    06/30/25 1630 -- -- 94 18 145/90 Lying 98    06/30/25 1620 -- -- 90 18 140/90 Lying 98    06/30/25 16:18:29 -- -- 91 15 -- -- 99    06/30/25 15:35:47 -- -- 87 17 141/82 -- 99    06/30/25 1525 -- -- 80 -- 131/77 -- 97    06/30/25 1515 98.7 (37.1) Oral -- -- -- -- --          Oxygen Therapy (last day)       Date/Time SpO2 Device (Oxygen Therapy) Flow (L/min) (Oxygen Therapy) Oxygen Concentration (%) ETCO2 (mmHg)    07/01/25 0809 99 room air -- -- --    07/01/25 0800 -- room air -- -- --    07/01/25 0751 99 room air -- -- --    07/01/25 0400 -- room air -- -- --    07/01/25 0300 -- room air -- -- --    07/01/25 0100 97 -- -- -- --    07/01/25 0001 100 -- -- -- --    07/01/25 0000 -- room air -- -- --    06/30/25 2300 97 -- -- -- --    06/30/25 2230 96 -- -- -- --    06/30/25 2200 97 -- -- -- --    06/30/25 2130 97 -- -- -- --    06/30/25 2055 96 -- -- -- --    06/30/25 2050 98 -- -- -- --    06/30/25 2045 98 -- -- -- --    06/30/25 2040 98 -- -- -- --    06/30/25 2035 99 -- -- -- --    06/30/25 2030 98 -- -- -- --    06/30/25 2025 97 -- -- -- --    06/30/25 2020 98 -- -- -- --    06/30/25 2015 98 -- -- -- --    06/30/25 2010 98 -- -- -- --    06/30/25 2005 99 -- -- -- --    06/30/25 2000 97 nasal cannula 1 -- --    06/30/25 1900 -- nasal cannula 1 -- --    06/30/25 1845 96 nasal cannula 1 -- --    06/30/25 1830 98 nasal cannula 1 -- --    06/30/25 1815 98 nasal cannula 1 -- --    06/30/25 1800 99 nasal cannula 1 -- --    06/30/25 1745 99 nasal cannula 1 -- --    06/30/25 1730 99 nasal cannula 1 -- --    06/30/25 1715 97 nasal cannula 1 -- --    06/30/25 1700 99 nasal cannula 1 -- --    06/30/25 1645 97 nasal cannula 1 -- --    06/30/25 1634 97 room air 1 -- --    06/30/25 1630 98 nasal cannula 1 -- --    06/30/25 1620 98 nasal cannula 1 -- --    06/30/25 16:18:29 99 -- -- -- --    06/30/25  15:35:47 99 -- -- -- --    06/30/25 15:35:41 -- nasal cannula 2 -- --    06/30/25 1525 97 -- -- -- --          Facility-Administered Medications as of 7/1/2025   Medication Dose Route Frequency Provider Last Rate Last Admin    acetaminophen (TYLENOL) tablet 650 mg  650 mg Oral Q4H PRN Warner Zaidi MD   650 mg at 07/01/25 0345    [COMPLETED] aspirin chewable tablet 324 mg  324 mg Oral Once Storm Ndiaye MD   Given at 06/30/25 1525    atorvastatin (LIPITOR) tablet 40 mg  40 mg Oral Nightly Warner Zaidi MD   40 mg at 06/30/25 2215    atropine sulfate (0.1 mg/mL) Intravenous 0.5 mg / 5 mL  0.5 mg Intravenous Q5 Min PRN Warner Zaidi MD        bisoprolol (ZEBeta) tablet 5 mg  5 mg Oral Q24H Warner Zaidi MD   5 mg at 07/01/25 0800    [COMPLETED] eptifibatide (INTEGRILIN) 75 mg in 100 mL solution    Code / Trauma / Sedation Continuous Med Warner Zaidi MD 16.48 mL/hr at 06/30/25 1554 2 mcg/kg/min at 06/30/25 1554    [COMPLETED] heparin (porcine) injection 4,000 Units  4,000 Units Intravenous Once Storm Ndiaye MD   Given at 06/30/25 1525    [Held by provider] HYDROcodone-acetaminophen (NORCO) 7.5-325 MG per tablet 2 tablet  2 tablet Oral Q6H PRN Warner Zaidi MD        mupirocin (BACTROBAN) 2 % nasal ointment 1 Application  1 Application Each Nare BID Jerome Shrestha DO   1 Application at 07/01/25 0800    nicotine (NICODERM CQ) 21 MG/24HR patch 1 patch  1 patch Transdermal Q24H Jerome Shrestha DO   1 patch at 07/01/25 0800    nitroglycerin (NITROSTAT) SL tablet 0.4 mg  0.4 mg Sublingual Q5 Min PRWarner Grullon MD        [COMPLETED] ondansetron (ZOFRAN) injection 4 mg  4 mg Intravenous Once Storm Ndiaye MD   4 mg at 06/30/25 1525    [COMPLETED] potassium chloride (KLOR-CON M20) CR tablet 20 mEq  20 mEq Oral Once Day, FREDI Chacon   20 mEq at 06/30/25 1836    sodium chloride 0.9 % flush 10 mL  10 mL Intravenous PRN  Warner Zaidi MD        sodium chloride 0.9 % infusion 250 mL  250 mL Intravenous Once PRN Warner Zaidi MD        [COMPLETED] sodium chloride 0.9 % infusion    Code / Trauma / Sedation Continuous Med Warner Zaidi MD 75 mL/hr at 25 1537 75 mL/hr at 25 1537    sodium chloride 0.9 % infusion  50 mL/hr Intravenous Continuous Warner Zaidi MD 50 mL/hr at 25 2215 50 mL/hr at 25 2215    [] sodium chloride 0.9 % infusion  3 mL/kg/hr Intravenous Continuous Warner Zaidi MD   Stopped at 25 0030    [COMPLETED] Sulfur Hexafluoride Microsph (LUMASON) 60.7-25 MG IV reconstituted suspension reconstituted suspension 3 mL  3 mL Intravenous Once in imaging Jerome Shrestha DO   3 mL at 25 0047    ticagrelor (BRILINTA) tablet 90 mg  90 mg Oral BID Warner Zaidi MD   90 mg at 25 0800     Operative/Procedure Notes (all)    No notes of this type exist for this encounter.       Physician Progress Notes (all)    No notes of this type exist for this encounter.          Consult Notes (all)        Warner Zaidi MD at 25 1524        Consult Orders    1. Consult Interventional Cardiology and Notify Cath Lab [[id number]] ordered by Warner Zaidi MD at 25 1523                     Cardiology Consult Note    Patient Identification:  Name: Amrik Dowell  Age: 55 y.o.  Sex: male  :  1970  MRN: [id number]             Requesting Physician : Dr. Storm Ndiaye    Reason for Consultation / Chief Complaint :   STEMI    History of Present Illness:      Mr. Amrik Dowell has PMH of    Hypertension  Erythrocytosis/polycythemia  Positive family history of premature CAD in grandfather  Cigarette smoker    Presented through emergency room with complaint of chest pain.  Patient has been having chest pain for over a day now which is burning and type radiating to the left  shoulder, thought it was  acid reflux worse therefore came into the hospital.  Was found to have ST elevation inferiorly therefore interventional cardiology was consulted.      Assessment:  :    Acute inferior wall STEMI  CAD  Hypertension  Dyslipidemia  Elevated creatinine  Positive family history  Cigarette smoker      Recommendations / Plan:        Patient presented with chest pain which was over 1 day duration.  EKG in the emergency room revealed ST elevation in inferior leads therefore interventional cardiology was consulted.  Patient was having chest pain, was taken to the Cath Lab for primary PCI.  Risk-benefit alternatives explained and patient underwent drug-eluting stent to proximal and mid RCA which was subtotal.  And was hazy and with plaque.  Patient was given Integrilin intravenously, IV heparin intravenously and started on Brilinta orally.  Patient's LVEDP was low at 6 mmHg, will hydrate and monitor renal function.  Will give Brilinta 90 twice daily, baby aspirin, statins and beta-blockers as tolerated.  Counseled on smoking cessation.  Discussed with patient his wife and mother by bedside.  Sinus rhythm          Cardiographics  ECG: EKG tracing was  personally reviewed/interpreted by me  ECG 12 Lead Rhythm Change   Preliminary Result   HEART RATE=92  bpm   RR Eszzmrlu=710  ms   NE Rmvezrud=725  ms   P Horizontal Axis=-3  deg   P Front Axis=66  deg   QRSD Zdjcophi=083  ms   QT Shszwndk=899  ms   TUwB=517  ms   QRS Axis=-16  deg   T Wave Axis=-29  deg   - ABNORMAL ECG -   Sinus rhythm   Ventricular premature complex   Evolving inferior infarct since 30-Jun-2025 15:16:35   ST elevation, consider anterolateral injury   When compared with ECG of 30-Jun-2025 15:16:35,   Significant rate increase   New or worsened ischemia or infarction   Date and Time of Study:2025-06-30 17:32:55      Telemetry Scan   Final Result      ECG 12 Lead Chest Pain   Preliminary Result   HEART RATE=61  bpm   RR Mnfanjwi=421  ms   NE Beakuslv=777  ms   P  Horizontal Axis=0  deg   P Front Axis=57  deg   QRSD Interval=92  ms   QT Eqswmhlb=218  ms   ZJnX=717  ms   QRS Axis=58  deg   T Wave Axis=81  deg   - ABNORMAL ECG -   Sinus rhythm   Inferior infarct, acute (RCA)   ST elevation, consider lateral injury   Date and Time of Study:2025-06-30 15:16:35      ECG 12 Lead Chest Pain    (Results Pending)       Telemetry: Sinus rhythm      Assessment       Diagnosis Plan   1. Acute ST elevation myocardial infarction (STEMI) involving other coronary artery of inferior wall                            Past Medical History:  No past medical history on file.  Past Surgical History:  No past surgical history on file.   Allergies:  No Known Allergies  Home Meds:  Medications Prior to Admission   Medication Sig Dispense Refill Last Dose/Taking    HYDROcodone-acetaminophen (NORCO) 7.5-325 MG per tablet Take 1-2 tablets by mouth Every 6 (Six) Hours As Needed for Moderate Pain . 15 tablet 0      Current Meds:     Current Facility-Administered Medications:     acetaminophen (TYLENOL) tablet 650 mg, 650 mg, Oral, Q4H PRN, Warner Zaidi MD, 650 mg at 06/30/25 1729    atorvastatin (LIPITOR) tablet 40 mg, 40 mg, Oral, Nightly, Warner Zaidi MD    atropine sulfate (0.1 mg/mL) Intravenous 0.5 mg / 5 mL, 0.5 mg, Intravenous, Q5 Min PRN, Warner Zaidi MD    bisoprolol (ZEBeta) tablet 5 mg, 5 mg, Oral, Q24H, Warner Zaidi MD    [Held by provider] HYDROcodone-acetaminophen (NORCO) 7.5-325 MG per tablet 2 tablet, 2 tablet, Oral, Q6H PRN, Warner Zaidi MD    nitroglycerin (NITROSTAT) SL tablet 0.4 mg, 0.4 mg, Sublingual, Q5 Min PRN, Warner Zaidi MD    potassium chloride (KLOR-CON M20) CR tablet 20 mEq, 20 mEq, Oral, Once, Day, Beatriz G, APRN    sodium chloride 0.9 % flush 10 mL, 10 mL, Intravenous, PRN, Warner Zaidi MD    sodium chloride 0.9 % infusion 250 mL, 250 mL, Intravenous, Once PRN, Warner Zaidi  "MD    sodium chloride 0.9 % infusion, 50 mL/hr, Intravenous, Continuous, Warner Zaidi MD    sodium chloride 0.9 % infusion, 3 mL/kg/hr, Intravenous, Continuous, Warner Zaidi MD, Last Rate: 309 mL/hr at 06/30/25 1740, 3 mL/kg/hr at 06/30/25 1740  Social History:   Social History     Tobacco Use    Smoking status: Never    Smokeless tobacco: Not on file   Substance Use Topics    Alcohol use: Not on file      Family History:  No family history on file.     Review of Systems : Review of Systems   All other systems reviewed and are negative.             Constitutional:  Temp:  [97.7 °F (36.5 °C)-98.7 °F (37.1 °C)] 97.7 °F (36.5 °C)  Heart Rate:  [71-94] 71  Resp:  [15-18] 18  BP: (129-147)/(77-93) 131/87    Physical Exam   /87   Pulse 71   Temp 97.7 °F (36.5 °C) (Oral)   Resp 18   Ht 190.5 cm (75\")   Wt 103 kg (227 lb 15.3 oz)   SpO2 99%   BMI 28.49 kg/m²   Physical Exam  General:  Appears in no acute distress  Eyes: Sclerae are anicteric,  conjunctivae are clear   HEENT:  No JVD. Thyroid not visibly enlarged. No mucosal pallor or cyanosis  Respiratory: Respirations regular and unlabored at rest.  Bilaterally good breath sounds with good air entry in all fields. No crackles, rubs or wheezes auscultated  Cardiovascular: S1,S2 Regular rate and rhythm. No murmur, rub or gallop auscultated. No pretibial pitting edema  Gastrointestinal: Abdomen nondistended.  Musculoskeletal:  No abnormal movements  Extremities: No digital clubbing or cyanosis  Skin: Color pink. Skin warm and dry to touch.   Neuro: Alert and awake, no lateralizing deficits appreciated        Echocardiogram:       STRESS TEST        Cardiolite (Tc-99m sestamibi) stress test    HEART CATHETERIZATION  No results found for this or any previous visit.        Imaging  Chest X-ray:   Imaging Results (Last 24 Hours)       ** No results found for the last 24 hours. **            Lab Review: I have reviewed the labs  Results from " last 7 days   Lab Units 25  1528   HSTROP T ng/L 284*     Results from last 7 days   Lab Units 25  1528   MAGNESIUM mg/dL 2.3     Results from last 7 days   Lab Units 25  1528   SODIUM mmol/L 138   POTASSIUM mmol/L 3.4*   BUN mg/dL 17.5   CREATININE mg/dL 1.54*   CALCIUM mg/dL 9.3             Results from last 7 days   Lab Units 25  1528   WBC 10*3/mm3 9.93   HEMOGLOBIN g/dL 18.1*   HEMATOCRIT % 53.1*   PLATELETS 10*3/mm3 193     Results from last 7 days   Lab Units 25  1528   INR  1.03             Warner Zaidi MD  2025, 18:22 EDT      EMR Dragon/Transcription:   Dictated utilizing Dragon dictation    Electronically signed by Warner Zaidi MD at 25 1822          [facility] CATH LAB  [address] [place] [address] [phone]              Cardiac Catheterization/Vascular Study    Patient Name: mArik Dowell   Patient MRN: [id number]   Patient : 1970 (55 y.o.)   Legal Sex: Male    Accession Number: [id number]   Date of Study: 25   Ordering Provider: Warner Zaidi MD    Clinical Indications: STEMI       Performing Physician  Performing Staff   Primary: Warner Zaidi MD     Scrub Person: Katie Horta    Documenter: Marion Whitlock    Invasive Nurse: Everardo Nunn RN    Invasive Nurse: Nirmala Fragoso RN           Procedures    Left Heart Cath   Percutaneous Coronary Intervention   Stent CLARY coronary       Patient Information    Patient Name  Amrik Dowell MRN  [id number] Legal Sex  Male  (Age)  1970 (55 y.o.)     Race Ethnicity Encounter Category   White or  Not  or  Emergency        [facility]     Show images for Cardiac Catheterization/Vascular Study         Printable Vessel Diagram    Printable Vessel Diagram           Pre Procedure Diagnosis    STEMI         Conclusion                                                                CARDIAC  CATHETERIZATION PCI REPORT        NAME:              Amrik Dowell  :                1970  AGE/SEX:        55 y.o. male  MRN:                [id number]     2025      : Warner Zaidi.     DATE OF PROCEDURE: 2025     INDICATION FOR PROCEDURE: Acute ST elevation inferior myocardial infarction     PROCEDURE PERFORMED:   1.  Left heart cath, coronary arteriogram and left ventriculogram  2.  PTCA and drug-eluting stent to proximal and mid RCA           Procedure Description :     The patient underwent successful [x]  Left  []  Right  []  Left & Right  Heart catheterization and coronary angiography via the   [x]  Femoral approach  []  Radial approach  []  Brachial approach     Procedure Narrative:      After informed consent, under conscious sedation,  under conscious sedation, given and monitored by me,moderate conscious sedation was given utilizing IV Versed and fentanyl administered by RN with continuous EKG oximetry and hemodynamic monitoring supervised by me throughout the entire case.  I was present with the patient for the duration of moderate sedation and supervised staff who had no other duties and monitored the patient for the entire procedure patient had Bullard 2-3 sedation scale .  And local anesthesia a 6 Cymraes sheath was placed in right femoral artery.  6 Cymraes hockey-stick guide was used to engage the right coronary ostium and obtain guiding shots.  This revealed that right coronary artery was the culprit vessel with hazy thrombotic lesion in the majority of vertical segment of proximal and mid RCA then in the distal portion of the vertical segment there was a 99% stenosis with haziness consistent with thrombus.  Therefore after giving heparin and Integrilin intravenously a run-through wire was used to cross the lesion it was predilated.  The distal lesion was stented with 4 x 18 Xience and the proximal and mid lesion was stented with 4.5 x 28 Xience CLARY with reduction of  lesion to 0 with this KARSTEN-3 flow.  Then JL 4 diagnostic was used to do left 3 arteriogram.  Pigtail was used to do LV measurements and hand-injection LV gram.  Patient tolerated procedure well.  Complications none.        FINDINGS:     Hemodynamic :   1. Left ventricular pressure: 125/6  LVEDP:6  2. Aortic pressure: 118/66     AV Gradient:  no        LEFT VENTRICULOGRAPHY:     Wall Motion: Inferior wall hypokinesis  Mitral regurgitation: Difficult to see in the send ejection  Estimated EF : Difficult evaluating the send ejection     Coronary Arteriography: (Please Code highest degree of stenosis)  Dominance:  []  Left  [x]  Right  []  Co-Dominant     Left Main %: Is without disease                Proximal LAD %: 30% calcified proximal and mid narrowing  Mid/Distal LAD %: There are 2 large diagonals in midportion without disease     LCX %: Bap Gondi to high marginal which looks like ramus and distal circumflex continues in distal area and divides into 2 branches.  First marginal has calcified 30 to 40% proximal narrowing.        RCA %: 99% mid was subjected to drug-eluting stents with 4.5 x 28 and 4x18 Xience CLARY with reduction of lesion to 0 with brisk KARSTEN-3 flow     CORONARY INTERVENTION FINDINGS:   PCI Procedure Notes     Segment # proximal to midRCA  Culprit Lesion:  [x]  Yes  []  No  % Pre-Stenosis: 99% with haziness consistent with thrombus  Pre-Proc TIMIFlow: 1  % Post-Stenosis: 0  Post-Proc TIMIFlow: 3  Non-High/Non-C:                          High/C:yes  Lesion Length (mm):   Primary Device Used: 4.5 x 28 and 4 x 18 Xience CLARY        Conscious Sedation:   [x]  Yes   []  No  No Flow Phenom:       []  Yes  [x]  No  Dissection:                  []  Yes  [x]  No  Acute Closure:            []  Yes  [x]  No  Perforation:                 []  Yes  [x]  No     Complications:  none  Estimated Blood Loss:  5cc.        Impression :      Successful drug-eluting stent to proximal and mid RCA     Recommendation:        Routine STEMI care  Admit to [facility]  Integrilin for 12 hours, monitor for toxicities.  Dual antiplatelet therapy with aspirin and Brilinta as tolerated  Statins and beta-blockers as tolerated  Serial troponins  Patient's renal function came back elevated.  Will hydrate per protocol with 3 mL/kg for the first 4 hours then cut the fluid down to 50 cc an hour for 24 hours.  Monitor renal function.  Check echocardiogram.  Telemetry to monitor rhythm.  Discussed with intensivist nurse practitioner Beatriz        Pre-Procedure Notes  H&P Performed  [x]  Yes        []  No        []  N/A     Indications:   []  ACS <= 24 HRS   []  ACS >24 HRS   []  New Onset Angina <= 2 mos   []  Worsening Angina   []  Resuscitated Cardiac Arrest   []  Angina on Exertion:   []  Suspected CAD   []  Valvular Disease   []  Pericardial Disease   []  Cardiac Arrythmia   []  Cardiomyopathy   []  LV Dysfunction   []  Syncope   []  Post Cardiac Transplant   []  Eval. For Exercise Clearance  Non-Vascular     Risks, Benefits, & Complications Discussed:  [x]  Yes  []  No  []  N/A     Questions Answered:  [x]  Yes  []  No  []  N/A     Consent Obtained:  [x]  Yes  []  No  []  N/A     CHF: []  Yes  [x]  No                If Yes:  Newly Diagnosed?  []  Yes  []  No              If Yes:  HF Type:  []  Diastolic  []  Systolic  []  Unknown   Electronically signed by Warner Zaidi MD, 06/30/25, 8:21 PM EDT.                                     Recommendations         Dual antiplatelet therapy.       Procedure Narrative    Risks, benefits and alternatives were discussed with the patient and/or family. Plan is for moderate sedation. An immediate assessment was done prior to the administration of moderate sedation. Under my direct supervision, intravenous moderate sedation sedation was administered during the course of this procedure with continuous monitoring of hemodynamic parameters and level of consciousness by an independent trained observer. Less than  20 mL of estimated blood loss during the case. No specimen was collected during the case.       Time Under Physician Observation    Name Panel Role Time Period   Warner Zaidi MD Panel 1 Primary 6/30/2025 1540 - 6/30/2025 1607      Total Sedation Time    Total estimated time of moderate sedation was 31 minutes 25 seconds                     Vessel Access    A 6 fr sheath was successfully inserted into the right femoral artery.           Sheath Disposition    Transparent Dressing was used to secure the sheath post procedure.  Sheath Left Intact after the procedure.  Pressure Bag was used to stabalize the sheath post procedure.         Stent Inflated    Time Event Details User   3:53 PM Stent Inflated Stnt Cornry Rx Xience/Skypoint Rapdxng 4x18mm - First balloon inflation max pressure = 16 lionel. First balloon inflation duration = 24 seconds. DW   3:57 PM Stent Inflated Stnt Cornry Rx Xience/Skypoint Rapdxng 4.5x28mm - First balloon inflation max pressure = 18 lionel. First balloon inflation duration = 15 seconds. DW       Balloon Inflated    Time Event Details User   3:49 PM Balloon Inflated Baln Euphora 2.5x12mm - First balloon inflation max pressure = 10 lionel. First balloon inflation duration = 10 seconds. Second inflation of balloon - Max pressure = 10 lionel. 2nd Inflation of balloon - Duration = 11 seconds. DW                   Complications      Complications documented before study signed (6/30/2025  8:29 PM)       No complications were associated with this study.   Documented by Warner Zaidi MD - 6/30/2025  8:29 PM         Radiation Dose Tracking    Panel Radiation (mSv) Cumulative Air Kerma (mGy) Fluoro Time (min) DAP (mGy-cm2) Physician   Panel 1  652.000 4.1 33971.000 Warner Zaidi MD        Total Contrast        Administrations occurring from 1531 to 1619 on 06/30/25:   Medication Name Total Dose   iopamidol (ISOVUE-370) 76 % injection 80 mL         Patient Hx Of Height,  "Weight, and Vitals    Height Weight BSA (Calculated - sq m) BMI (Calculated) Retired BMI (kg/m2) Pulse BP   190.5 cm (75\") 103 kg (227 lb 15.3 oz)  28.5  67 127/93       Admission Information    Admission Date/Time Discharge Date/Time Room/Bed   06/30/25  1518  3113/1       Medications  (Filter: Administrations occurring from 0000 to 2029 on 06/30/25)   important  Continuous medications are totaled by the amount administered until 06/30/25 2029.       sodium chloride 0.9 % infusion (mL/hr)   Total dose: Cannot be calculated*  *Continuous medication not stopped within the calculation time range.  Date/Time Rate/Dose/Volume Action    06/30/25 1537 75 mL/hr New Bag      lidocaine (XYLOCAINE) 1 % injection (mL)   Total volume: 10 mL  Date/Time Rate/Dose/Volume Action    06/30/25 1543 10 mL Given      midazolam (VERSED) injection (mg)   Total dose: 2 mg  Date/Time Rate/Dose/Volume Action    06/30/25 1548 1 mg Given    1601 1 mg Given      nitroglycerin (TRIDIL) injection (mcg)   Total dose: 200 mcg  Date/Time Rate/Dose/Volume Action    06/30/25 1550 200 mcg Given      eptifibatide (INTEGRILIN) injection (mcg/kg)   Total dose: 37,080 mcg Dosing weight: 103  Date/Time Rate/Dose/Volume Action    06/30/25 1550 18,540 mcg Given    1600 18,540 mcg Given      heparin (porcine) injection (Units)   Total dose: 5,000 Units  Date/Time Rate/Dose/Volume Action    06/30/25 1554 5,000 Units Given      eptifibatide (INTEGRILIN) 75 mg in 100 mL solution (mcg/kg/min)   Total dose: Cannot be calculated* Dosing weight: 103  *Continuous medication not stopped within the calculation time range.  Date/Time Rate/Dose/Volume Action    06/30/25 1554 2 mcg/kg/min - 16.48 mL/hr New Bag      ticagrelor (BRILINTA) tablet (mg)   Total dose: 180 mg  Date/Time Rate/Dose/Volume Action    06/30/25 1558 180 mg Given      fentaNYL citrate (PF) (SUBLIMAZE) injection (mcg)   Total dose: 100 mcg  Date/Time Rate/Dose/Volume Action    06/30/25 1602 50 mcg Given  "   1604 50 mcg Given      iopamidol (ISOVUE-370) 76 % injection (mL)   Total volume: 80 mL  Date/Time Rate/Dose/Volume Action    06/30/25 1606 80 mL Given      acetaminophen (TYLENOL) tablet 650 mg (mg)   Total dose: 650 mg Dosing weight: 103  Date/Time Rate/Dose/Volume Action    06/30/25 1729 650 mg Given      bisoprolol (ZEBeta) tablet 5 mg (mg)   Total dose: 5 mg Dosing weight: 103  Date/Time Rate/Dose/Volume Action    06/30/25 1836 5 mg Given      HYDROcodone-acetaminophen (NORCO) 7.5-325 MG per tablet 2 tablet (tablet)   Total dose: Cannot be calculated* Dosing weight: 103  *Administration dose not documented  Date/Time Rate/Dose/Volume Action    06/30/25 1646 *Not included in total Held by provider      sodium chloride 0.9 % flush 10 mL (mL)   Total dose: Cannot be calculated* Dosing weight: 103  *Administration dose not documented  Date/Time Rate/Dose/Volume Action    06/30/25 1531 *Not included in total MAR Hold    1703 *Not included in total MAR Unhold      potassium chloride (KLOR-CON M20) CR tablet 20 mEq (mEq)   Total dose: 20 mEq Dosing weight: 103  Date/Time Rate/Dose/Volume Action    06/30/25 1836 20 mEq Given      sodium chloride 0.9 % infusion (mL/kg/hr)   Total volume: 870.35 mL Dosing weight: 103  Date/Time Rate/Dose/Volume Action    06/30/25 1740 3 mL/kg/hr - 309 mL/hr New Bag        Implants    Type Not Specified      Stnt Cornry Rx Xience/Skypoint Rapdxng 4x18mm - [id number] - Implanted    Inventory item: STNT CORNRY RX XIENCE/SKYPOINT RAPDXNG 4X18MM Model/Cat number: [id number]   : [employer] Lot number: [id number]     As of 6/30/2025    Status: Implanted       Stnt Cornry Rx Xience/Skypoint Rapdxng 4.5x28mm - [id number] - Implanted    Inventory item: STNT CORNRY RX XIENCE/SKYPOINT RAPDXNG 4.5X28MM Model/Cat number: [id number]   : [employer] Lot number: [id number]     As of 6/30/2025    Status: Implanted         Supplies    Name ID Temporary Type Charge Code  Description Charge Code Quantity   CATH DIAG IMPULSE 145 ANGL 6F PK/3 24785 No Diagnostic Catheter HC OR SUPPLY SHELL 272/NO CPT 285018 1   GW DIAG EMERALD HEPCOAT MOVE JTIP STD .035 3MM 150CM 47552 No Guidewire HC OR SUPPLY SHELL 272/ 864559 1   GW RUNTHROUGH NS HYPERCOAT .014 9T340OU 48837 No Guidewire HC OR SUPPLY SHELL 272/ 554620 1   PK TRY HEART CATH 50 83146 No Supply   1   DEV INFL COMPAK W/ACCESSPLUS DP2247 05769 No Supply HC OR SUPPLY SHELL 272/NO CPT 306902 1   BALN EUPHORA 2.5X12MM 61321 No Supply HC OR SUPPLY SHELL 272/ 048630 1   CATH DIAG IMPULSE FL4 6F 100CM 06600 No Diagnostic Catheter HC OR SUPPLY SHELL 272/NO CPT 046354 1   CATH DIAG IMPULSE FR4 6F 100CM 95289 No Diagnostic Catheter HC OR SUPPLY SHELL 272/NO CPT 061214 1   SHEATH INTRO PINN CORNRY .038 6F10CM 41395 No Supply HC OR SUPPLY SHELL 272/ 043041 1   ELECTRD DEFIB M/FUNC PROPADZ RADIOL 2PK 87977 No Supply HC OR SUPPLY SHELL 272/NO CPT 565810 1   CATH GUIDE LAUNCHER HOCKEY STK 2 [id number] 17229 No Guide Catheter HC OR SUPPLY SHELL 272/ 095747 1   NDL PERC 1PRT THNWALL W/BASEPLT 18G 7CM 42180 No Supply HC OR SUPPLY SHELL 272/NO CPT 660502 1       Signed    Electronically signed by Warner Zaidi MD on 6/30/25 at 2029 EDT     Cardiac Catheterization/Vascular Study  Order: [id number]   Status: Final result      Result Care Coordination      Patient Communication     Released  Not seen              Link to Procedure Log    Procedure Log        Order-Level Documents:    Scan on 6/30/2025 1607 by [facility]               Results Routing Tracking    View Results Routing Information     Order Report     Order Details

## 2025-07-01 NOTE — CASE MANAGEMENT/SOCIAL WORK
Discharge Planning Assessment   [facility]     Patient Name: Amrik Dowell  MRN: [id number]  Today's Date: 7/1/2025    Admit Date: 6/30/2025    Plan: [facility] PLAN: Routine home with wife Maty   Discharge Needs Assessment       Row Name 07/01/25 1123       Living Environment    People in Home spouse    Name(s) of People in Home Maty    Current Living Arrangements home    Potentially Unsafe Housing Conditions none    In the past 12 months has the electric, gas, oil, or water company threatened to shut off services in your home? No    Primary Care Provided by self    Provides Primary Care For no one    Family Caregiver if Needed spouse    Family Caregiver Names Maty    Quality of Family Relationships helpful;involved;supportive    Able to Return to Prior Arrangements yes       Resource/Environmental Concerns    Resource/Environmental Concerns none    Transportation Concerns none       Transportation Needs    In the past 12 months, has lack of transportation kept you from medical appointments or from getting medications? no    In the past 12 months, has lack of transportation kept you from meetings, work, or from getting things needed for daily living? No       Food Insecurity    Within the past 12 months, you worried that your food would run out before you got the money to buy more. Never true    Within the past 12 months, the food you bought just didn't last and you didn't have money to get more. Never true       Transition Planning    Patient/Family Anticipates Transition to home with family    Patient/Family Anticipated Services at Transition none    Transportation Anticipated family or friend will provide       Discharge Needs Assessment    Readmission Within the Last 30 Days no previous admission in last 30 days    Equipment Currently Used at Home none    Concerns to be Addressed denies needs/concerns at this time    Do you want help finding or keeping work or a job? Patient declined    Do you want help with school  or training? For example, starting or completing job training or getting a high school diploma, GED or equivalent Patient declined    Anticipated Changes Related to Illness none    Equipment Needed After Discharge none                   Discharge Plan       Row Name 07/01/25 1124       Plan    Plan DC PLAN: Routine home with wife Maty    Patient/Family in Agreement with Plan yes    Plan Comments CM met with patient and wife at bedside. Confirmed PCP, insurance, and pharmacy.  Meds to beds enrolled. Patient denies any difficulty affording medications. Patient not current with any therapies. Patient performs ADL independently. Confirmed transportation at discharge will be his wife Maty. CM provided [employer] card for Brillanta to be filled at [facility] for $30. Copy of insurance card obtained and sent to medical records. D/C Barriers: cardiac monitoring post heart cath, cardiology following                Demographic Summary       Row Name 07/01/25 1123       General Information    Admission Type inpatient    Arrived From emergency department    Referral Source admission list    Reason for Consult discharge planning       Contact Information    Permission Granted to Share Info With                    Functional Status       Row Name 07/01/25 1123       Functional Status    Usual Activity Tolerance excellent    Current Activity Tolerance excellent       Functional Status, IADL    Medications independent    Meal Preparation independent    Housekeeping independent    Laundry independent    Shopping independent    If for any reason you need help with day-to-day activities such as bathing, preparing meals, shopping, managing finances, etc., do you get the help you need? I get all the help I need               Bessie Puentes RN    Office: [phone]

## 2025-07-02 VITALS
BODY MASS INDEX: 28.23 KG/M2 | HEIGHT: 75 IN | RESPIRATION RATE: 13 BRPM | TEMPERATURE: 98.1 F | OXYGEN SATURATION: 97 % | HEART RATE: 68 BPM | WEIGHT: 227 LBS | DIASTOLIC BLOOD PRESSURE: 72 MMHG | SYSTOLIC BLOOD PRESSURE: 122 MMHG

## 2025-07-02 PROBLEM — I21.19 ACUTE ST ELEVATION MYOCARDIAL INFARCTION (STEMI) OF INFERIOR WALL: Status: RESOLVED | Noted: 2025-06-30 | Resolved: 2025-07-02

## 2025-07-02 PROBLEM — I21.11 STEMI INVOLVING RIGHT CORONARY ARTERY: Chronic | Status: RESOLVED | Noted: 2025-06-30 | Resolved: 2025-07-02

## 2025-07-02 LAB
ANION GAP SERPL CALCULATED.3IONS-SCNC: 10 MMOL/L (ref 5–15)
BUN SERPL-MCNC: 12.9 MG/DL (ref 6–20)
BUN/CREAT SERPL: 9.6 (ref 7–25)
CALCIUM SPEC-SCNC: 8.8 MG/DL (ref 8.6–10.5)
CHLORIDE SERPL-SCNC: 105 MMOL/L (ref 98–107)
CO2 SERPL-SCNC: 22 MMOL/L (ref 22–29)
CREAT SERPL-MCNC: 1.35 MG/DL (ref 0.76–1.27)
EGFRCR SERPLBLD CKD-EPI 2021: 62.4 ML/MIN/1.73
GLUCOSE SERPL-MCNC: 96 MG/DL (ref 65–99)
POTASSIUM SERPL-SCNC: 4.5 MMOL/L (ref 3.5–5.2)
SODIUM SERPL-SCNC: 137 MMOL/L (ref 136–145)
TROPONIN T SERPL HS-MCNC: 577 NG/L

## 2025-07-02 PROCEDURE — 84484 ASSAY OF TROPONIN QUANT: CPT | Performed by: NURSE PRACTITIONER

## 2025-07-02 PROCEDURE — 80048 BASIC METABOLIC PNL TOTAL CA: CPT | Performed by: INTERNAL MEDICINE

## 2025-07-02 PROCEDURE — 99232 SBSQ HOSP IP/OBS MODERATE 35: CPT | Performed by: INTERNAL MEDICINE

## 2025-07-02 RX ORDER — ACETAMINOPHEN 325 MG/1
650 TABLET ORAL EVERY 4 HOURS PRN
Start: 2025-07-02

## 2025-07-02 RX ORDER — CALCIUM CARBONATE 500 MG/1
2 TABLET, CHEWABLE ORAL 3 TIMES DAILY PRN
Qty: 30 TABLET | Refills: 0 | Status: SHIPPED | OUTPATIENT
Start: 2025-07-02 | End: 2025-07-12

## 2025-07-02 RX ORDER — NITROGLYCERIN 0.4 MG/1
0.4 TABLET SUBLINGUAL
Qty: 30 TABLET | Refills: 0 | Status: SHIPPED | OUTPATIENT
Start: 2025-07-02

## 2025-07-02 RX ORDER — NICOTINE 21 MG/24HR
1 PATCH, TRANSDERMAL 24 HOURS TRANSDERMAL
Qty: 19 PATCH | Refills: 0 | Status: SHIPPED | OUTPATIENT
Start: 2025-07-03 | End: 2025-07-22

## 2025-07-02 RX ORDER — AMLODIPINE BESYLATE 5 MG/1
5 TABLET ORAL
Qty: 30 TABLET | Refills: 0 | Status: SHIPPED | OUTPATIENT
Start: 2025-07-03 | End: 2025-08-02

## 2025-07-02 RX ORDER — DAPAGLIFLOZIN 10 MG/1
10 TABLET, FILM COATED ORAL DAILY
Qty: 30 TABLET | Refills: 1 | Status: SHIPPED | OUTPATIENT
Start: 2025-07-02

## 2025-07-02 RX ORDER — ASPIRIN 81 MG/1
81 TABLET ORAL DAILY
Qty: 30 TABLET | Refills: 0 | Status: SHIPPED | OUTPATIENT
Start: 2025-07-03 | End: 2025-08-02

## 2025-07-02 RX ORDER — ATORVASTATIN CALCIUM 40 MG/1
40 TABLET, FILM COATED ORAL NIGHTLY
Qty: 40 TABLET | Refills: 0 | Status: SHIPPED | OUTPATIENT
Start: 2025-07-02 | End: 2025-08-11

## 2025-07-02 RX ORDER — BISOPROLOL FUMARATE 5 MG/1
5 TABLET, FILM COATED ORAL
Qty: 30 TABLET | Refills: 0 | Status: SHIPPED | OUTPATIENT
Start: 2025-07-03 | End: 2025-08-02

## 2025-07-02 RX ORDER — TICAGRELOR 90 MG/1
90 TABLET, FILM COATED ORAL 2 TIMES DAILY
Qty: 60 TABLET | Refills: 0 | Status: SHIPPED | OUTPATIENT
Start: 2025-07-02 | End: 2025-08-01

## 2025-07-02 RX ADMIN — TICAGRELOR 90 MG: 90 TABLET ORAL at 08:34

## 2025-07-02 RX ADMIN — ASPIRIN 81 MG: 81 TABLET, COATED ORAL at 08:33

## 2025-07-02 RX ADMIN — AMLODIPINE BESYLATE 5 MG: 5 TABLET ORAL at 08:34

## 2025-07-02 RX ADMIN — MUPIROCIN 1 APPLICATION: 20 OINTMENT TOPICAL at 08:34

## 2025-07-02 RX ADMIN — BISOPROLOL FUMARATE 5 MG: 5 TABLET ORAL at 08:34

## 2025-07-02 RX ADMIN — NICOTINE 1 PATCH: 21 PATCH TRANSDERMAL at 08:36

## 2025-07-02 NOTE — CASE MANAGEMENT/SOCIAL WORK
Case Management Discharge Note      Final Note: Routine home         Selected Continued Care - Discharged on 7/2/2025 Admission date: 6/30/2025 - Discharge disposition: Home or Self Care       Transportation Services  Transportation: Private Transportation  Private: Car    Final Discharge Disposition Code: 01 - home or self-care

## 2025-07-02 NOTE — DISCHARGE SUMMARY
"[facility] Medicine Services  Discharge Summary    Date of Service: 2025  Patient Name: Amrik Dowell  : 1970  MRN: [id number]    Date of Admission: 2025  Discharge Diagnosis: STEMI involving right coronary artery  Date of Discharge: 2025  Primary Care Physician: Genaro Bella MD      Presenting Problem:   Acute ST elevation myocardial infarction (STEMI) involving other coronary artery of inferior wall [I21.19]  Acute ST elevation myocardial infarction (STEMI) of inferior wall [I21.19]    Active and Resolved Hospital Problems:  Active Hospital Problems   No active problems to display.      Resolved Hospital Problems    Diagnosis POA    **STEMI involving right coronary artery [I21.11] Yes    Acute ST elevation myocardial infarction (STEMI) of inferior wall [I21.19] Yes         Hospital Course     HPI:    Amrik Dowell is a 55 y.o. male with PMH of erythrocytosis and tobacco abuse disorder who presented to the ED today for evaluation of chest pain. The pain had been present intermittently for 2 to 3 days which felt like a burning sensation similar to heartburn and \"felt like I just needed to belch\". He treated his symptoms with over-the-counter heartburn medications with intermittent relief. He reports associated pain in the left shoulder which was present for most of the weekend ;Patient underwent PCI to RCA.     Hospital Course:  Patient underwent cardiac cath for STEMI 2025 with PCI and stents to the culprit lesion, RCA  c/w Brilinta 90 twice daily, baby aspirin, statins and beta-blockers as tolerated.   Recommended to do sleep study as outpatient and advised on smoking cessation        DISCHARGE Follow Up Recommendations for labs and diagnostics: PCP, cardiology        Day of Discharge     Vital Signs:  Temp:  [98 °F (36.7 °C)-98.3 °F (36.8 °C)] 98.1 °F (36.7 °C)  Heart Rate:  [60-75] 68  Resp:  [13-16] 13  BP: (120-133)/(65-87) 122/72    Physical Exam:  Physical Exam "   Gen: Resting comfortably  HENT: NC/AT, pink MMM  CVS: RRR, no M/G/R  Pulm: CTAB w/o wheezing or rhonchi  Abd: Nondistended, bowel sounds present, nontender   Ext:No clubbing or cyanosis  Skin: No petechia or jaundice  Neuro: AAOx3, CN II-XII intact  Psych: appropriate           Pertinent  and/or Most Recent Results     LAB RESULTS:      Lab 07/01/25  0349 06/30/25  1528   WBC 11.57* 9.93   HEMOGLOBIN 16.7 18.1*   HEMATOCRIT 49.6 53.1*   PLATELETS 163 193   NEUTROS ABS  --  5.04   IMMATURE GRANS (ABS)  --  0.03   LYMPHS ABS  --  3.60*   MONOS ABS  --  1.12*   EOS ABS  --  0.09   MCV 96.7 94.1   PROTIME  --  13.4         Lab 07/02/25 0229 07/01/25  1436 07/01/25 0349 06/30/25  1528   SODIUM 137 137 136 138   POTASSIUM 4.5 4.1 3.8 3.4*   CHLORIDE 105 105 101 98   CO2 22.0 22.3 26.9 25.7   ANION GAP 10.0 9.7 8.1 14.3   BUN 12.9 12.8 13.0 17.5   CREATININE 1.35* 1.45* 1.55* 1.54*   EGFR 62.4 57.3* 52.9* 53.3*   GLUCOSE 96 95 112* 139*   CALCIUM 8.8 8.6 9.1 9.3   MAGNESIUM  --   --   --  2.3   HEMOGLOBIN A1C  --   --  5.31  --          Lab 06/30/25  1528   TOTAL PROTEIN 7.1   ALBUMIN 4.2   GLOBULIN 2.9   ALT (SGPT) 25   AST (SGOT) 31   BILIRUBIN 0.7   ALK PHOS 71         Lab 07/02/25 0229 07/01/25  1436 07/01/25 0349 06/30/25  1756 06/30/25  1528   HSTROP T 577* 513* 605* 377* 284*   PROTIME  --   --   --   --  13.4   INR  --   --   --   --  1.03         Lab 07/01/25  0349   CHOLESTEROL 156   LDL CHOL 88   HDL CHOL 33*   TRIGLYCERIDES 204*             Brief Urine Lab Results       None          Microbiology Results (last 10 days)       ** No results found for the last 240 hours. **                         Results for orders placed during the hospital encounter of 06/30/25    Echocardiogram 2D complete 07/01/2025 12:58 PM    Interpretation Summary    Left ventricular ejection fraction appears to be 36 - 40%.    The following left ventricular wall segments are hypokinetic: Inferior and inferior septal    Left  ventricular diastolic function is consistent with (grade II w/high LAP) pseudonormalization.    Estimated right ventricular systolic pressure from tricuspid regurgitation is normal (<35 mmHg).    IVC is dilated.  2.3 cm.      Labs Pending at Discharge:  Pending Results       None            Procedures Performed  Procedure(s):  Left Heart Cath  Percutaneous Coronary Intervention  Stent CLARY coronary         Consults:   Consults       Date and Time Order Name Status Description    7/1/2025 10:42 AM Inpatient Hospitalist Consult Completed     6/30/2025  3:23 PM Consult Interventional Cardiology and Notify Cath Lab Completed               Discharge Details        Discharge Medications        New Medications        Instructions Start Date   acetaminophen 325 MG tablet  Commonly known as: TYLENOL   650 mg, Oral, Every 4 Hours PRN      amLODIPine 5 MG tablet  Commonly known as: NORVASC   5 mg, Oral, Every 24 Hours Scheduled   Start Date: July 3, 2025     aspirin 81 MG EC tablet   81 mg, Oral, Daily   Start Date: July 3, 2025     atorvastatin 40 MG tablet  Commonly known as: LIPITOR   40 mg, Oral, Nightly      bisoprolol 5 MG tablet  Commonly known as: ZEBeta   5 mg, Oral, Every 24 Hours Scheduled   Start Date: July 3, 2025     calcium carbonate 500 MG chewable tablet  Commonly known as: TUMS   2 tablets, Oral, 3 Times Daily PRN      dapagliflozin 5 MG tablet tablet  Commonly known as: FARXIGA   10 mg, Oral, Daily      nicotine 21 MG/24HR patch  Commonly known as: NICODERM CQ   1 patch, Transdermal, Every 24 Hours Scheduled   Start Date: July 3, 2025     nitroglycerin 0.4 MG SL tablet  Commonly known as: NITROSTAT   0.4 mg, Sublingual, Every 5 Minutes PRN, Take no more than 3 doses in 15 minutes.      ticagrelor 90 MG tablet tablet  Commonly known as: BRILINTA   90 mg, Oral, 2 Times Daily               No Known Allergies      Discharge Disposition:  home  Home or Self Care    Diet:  Hospital:  Diet Order   Procedures     Diet: Cardiac, Diabetic; Healthy Heart (2-3 Na+); Consistent Carbohydrate; Fluid Consistency: Thin (IDDSI 0)         Discharge Activity:         CODE STATUS:  Code Status and Medical Interventions: CPR (Attempt to Resuscitate); Full Support   Ordered at: 06/30/25 1703     Code Status (Patient has no pulse and is not breathing):    CPR (Attempt to Resuscitate)     Medical Interventions (Patient has pulse or is breathing):    Full Support     Level Of Support Discussed With:    Patient         No future appointments.        Time spent on Discharge including face to face service:  35 minutes    Signature: Electronically signed by Ponce Tovar MD, 07/02/25, 13:24 EDT.  [facility] Hospitalist Team

## 2025-07-02 NOTE — PROGRESS NOTES
Cardiology Progress Note    Patient Identification:  Name: Amrik Dowell  Age: 55 y.o.  Sex: male  :  1970  MRN: [id number]                 Follow Up / Chief Complaint: STEMI   Chief Complaint   Patient presents with    Chest Pain       Interval History:  Patient presented with chest pain and was found to have STEMI, patient underwent cardiac cath with stent to proximal and mid RCA     NP NOTE:  Patient was seen and examined.  Patient denies any chest pain, shortness of breath.  Showered this morning.  Troponin stable.  Okay to discharge home from cardiology standpoint.  Discussed importance of continuing medications and discussed discharge instructions with patient.     Electronically signed by FREDI Saldana, 25, 4:56 PM EDT.    Cardiology attending addendum :    I have personally performed a face-to-face diagnostic evaluation, physical exam and reviewed data on this patient.  I have reviewed documentation done by me and nurse practitioner  and corrected as needed.  And agree with the different components of documentation.Greater than 50% of the time spent in the care of this patient was provided by attending consultant/me.       Subjective: Patient seen and examined; chart and labs reviewed; discussed with bedside nurse.  Patient is without chest pain.  He is ambulating without discomfort.  Creatinine has improved        Objective: HS Troponin 284--377--605--513   2025: potassium 3.8, bun 13 creatinine 1.55 A1C 5.31 HDL 33 LDL 88 trigs 204  WBC 11.57   2025: HS troponin 577 potassium 4.5 creatinine 1.35     History of present illness:    Mr. Amrik Dowell has PMH of     Hypertension  Erythrocytosis/polycythemia  Positive family history of premature CAD in grandfather  Cigarette smoker     Presented through emergency room with complaint of chest pain.  Patient has been having chest pain for over a day now which is burning and type radiating to the left  shoulder, thought it was acid  reflux worse therefore came into the hospital.  Was found to have ST elevation inferiorly therefore interventional cardiology was consulted.        Assessment:  :     Acute inferior wall STEMI  CAD  Hypertension  Dyslipidemia  Elevated creatinine  Positive family history  Cigarette smoker        Recommendations / Plan:        Patient presented 6/30/2025 through emergency room with complaint of chest pain had ST elevation was taken to Cath Lab for primary PCI.  Patient's LVEDP was low.  Patient was given Integrilin aspirin and Brilinta  Patient underwent PCI to RCA.  Troponin has flattened and is trending down.  Creatinine was elevated at 1.55 came down to 1.35.  Continue dual antiplatelet therapy with aspirin and Brilinta as tolerated.  Continue medical management with aspirin, Brilinta, amlodipine, atorvastatin, bisoprolol as tolerated  Will follow-up as outpatient in 2 weeks  Will send him to cardiac rehab as outpatient.  Discussed with patient and family.  Counseled on smoking cessation  Will follow in cardiology clinic.  Discussed with nurse taking care of patient to coordinate care.      Copied text in this portion of the note has been reviewed and is accurate as of 7/2/2025    Past Medical History:  History reviewed. No pertinent past medical history.  Past Surgical History:  Past Surgical History:   Procedure Laterality Date    CARDIAC CATHETERIZATION N/A 6/30/2025    Procedure: Left Heart Cath;  Surgeon: Warner Zaidi MD;  Location: [facility] CATH INVASIVE LOCATION;  Service: Cardiology;  Laterality: N/A;    CARDIAC CATHETERIZATION N/A 6/30/2025    Procedure: Percutaneous Coronary Intervention;  Surgeon: Warner Zaidi MD;  Location: [facility] CATH INVASIVE LOCATION;  Service: Cardiology;  Laterality: N/A;  RCA    CARDIAC CATHETERIZATION N/A 6/30/2025    Procedure: Stent CLARY coronary;  Surgeon: Warner Zadii MD;  Location: [facility] CATH INVASIVE LOCATION;  Service: Cardiology;   "Laterality: N/A;  RCA        Social History:   Social History     Tobacco Use    Smoking status: Every Day     Types: Cigarettes    Smokeless tobacco: Not on file   Substance Use Topics    Alcohol use: Yes      Family History:  History reviewed. No pertinent family history.       Allergies:  No Known Allergies  Scheduled Meds:          Review of Systems:   ROS  Review of Systems   Constitution: Negative for chills and fever.   Cardiovascular: Negative for chest pain and palpitations.   Respiratory: Negative for cough and hemoptysis.    Gastrointestinal: Negative for nausea.        Constitutional:  Temp:  [98 °F (36.7 °C)-98.3 °F (36.8 °C)] 98.1 °F (36.7 °C)  Heart Rate:  [60-75] 68  Resp:  [13-16] 13  BP: (120-132)/(65-87) 122/72    Physical Exam   /72   Pulse 68   Temp 98.1 °F (36.7 °C) (Oral)   Resp 13   Ht 190.5 cm (75\")   Wt 103 kg (227 lb)   SpO2 97%   BMI 28.37 kg/m²   General:  Appears in no acute distress  Eyes: Sclera is anicteric,  conjunctiva is clear   HEENT:  No JVD. Thyroid not visibly enlarged. No mucosal pallor or cyanosis  Respiratory: Respirations regular and unlabored at rest.   Cardiovascular: Regular rate and rhythm  Gastrointestinal: Abdomen nondistended.  Musculoskeletal:  No abnormal movements  Extremities: Without hematuria  Skin: Color pink.   Neuro: Alert and awake.    INTAKE AND OUTPUT:    Intake/Output Summary (Last 24 hours) at 7/2/2025 1839  Last data filed at 7/2/2025 0955  Gross per 24 hour   Intake 2387 ml   Output 825 ml   Net 1562 ml       Cardiographics  Telemetry: Reviewed and interpreted by me reveals sinus rhythm    ECG:   Telemetry Scan   Final Result      Telemetry Scan   Final Result      Telemetry Scan   Final Result      Telemetry Scan   Final Result      Telemetry Scan   Final Result      Telemetry Scan   Final Result      Telemetry Scan   Final Result      Telemetry Scan   Final Result      Telemetry Scan   Final Result      Telemetry Scan   Final Result    "   ECG 12 Lead Rhythm Change   Final Result   HEART RATE=92  bpm   RR Vywgcefp=601  ms   KY Phrgoidx=642  ms   P Horizontal Axis=-3  deg   P Front Axis=66  deg   QRSD Kzuwxbas=131  ms   QT Vexwwwrn=733  ms   JWbE=385  ms   QRS Axis=-16  deg   T Wave Axis=-29  deg   - ABNORMAL ECG -   Sinus rhythm   Ventricular premature complex   Evolving inferior infarct since 30-Jun-2025 15:16:35   ST elevation, consider anterolateral injury   When compared with ECG of 30-Jun-2025 15:16:35,   Significant rate increase   New or worsened ischemia or infarction   Electronically Signed By: Guzman Major ([facility]) 2025-07-01 08:01:38   Date and Time of Study:2025-06-30 17:32:55      Telemetry Scan   Final Result      ECG 12 Lead Chest Pain   Final Result   HEART RATE=61  bpm   RR Zsazqiqi=873  ms   KY Oiirkgjr=939  ms   P Horizontal Axis=0  deg   P Front Axis=57  deg   QRSD Interval=92  ms   QT Ofsqakkc=005  ms   YRgS=404  ms   QRS Axis=58  deg   T Wave Axis=81  deg   - ABNORMAL ECG -   Sinus rhythm   Inferior  infarct, acute (RCA)   ST elevation, consider lateral injury   When compared with ECG of 07-Feb-2018 15:25:08,   Significant rate decrease   New or worsened ischemia or infarction   Electronically Signed By: Storm Ndiaye ([facility]) 2025-07-01 06:43:36   Date and Time of Study:2025-06-30 15:16:35        I have personally reviewed EKG    Echocardiogram: Results for orders placed during the hospital encounter of 06/30/25    Echocardiogram 2D complete 07/01/2025 12:58 PM    Interpretation Summary    Left ventricular ejection fraction appears to be 36 - 40%.    The following left ventricular wall segments are hypokinetic: Inferior and inferior septal    Left ventricular diastolic function is consistent with (grade II w/high LAP) pseudonormalization.    Estimated right ventricular systolic pressure from tricuspid regurgitation is normal (<35 mmHg).    IVC is dilated.  2.3 cm.      STRESS TEST      HEART CATHETERIZATION  Results for orders placed  during the hospital encounter of 25    Cardiac Catheterization/Vascular Study    Conclusion  CARDIAC CATHETERIZATION PCI REPORT      NAME:              Armik Dowell  :                1970  AGE/SEX:        55 y.o. male  MRN:                [id number]    2025     : Warner Zaidi.    DATE OF PROCEDURE: 2025    INDICATION FOR PROCEDURE: Acute ST elevation inferior myocardial infarction    PROCEDURE PERFORMED:  1.  Left heart cath, coronary arteriogram and left ventriculogram  2.  PTCA and drug-eluting stent to proximal and mid RCA        Procedure Description :    The patient underwent successful [x]  Left  []  Right  []  Left & Right  Heart catheterization and coronary angiography via the  [x]  Femoral approach  []  Radial approach  []  Brachial approach    Procedure Narrative:    After informed consent, under conscious sedation,  under conscious sedation, given and monitored by me,moderate conscious sedation was given utilizing IV Versed and fentanyl administered by RN with continuous EKG oximetry and hemodynamic monitoring supervised by me throughout the entire case.  I was present with the patient for the duration of moderate sedation and supervised staff who had no other duties and monitored the patient for the entire procedure patient had Bullard 2-3 sedation scale .  And local anesthesia a 6 [place] sheath was placed in right femoral artery.  6 [place] hockey-stick guide was used to engage the right coronary ostium and obtain guiding shots.  This revealed that right coronary artery was the culprit vessel with hazy thrombotic lesion in the majority of vertical segment of proximal and mid RCA then in the distal portion of the vertical segment there was a 99% stenosis with haziness consistent with thrombus.  Therefore after giving heparin and Integrilin intravenously a run-through wire was used to cross the lesion it was predilated.  The distal lesion was stented with 4 x 18  Xience and the proximal and mid lesion was stented with 4.5 x 28 Xience CLARY with reduction of lesion to 0 with this KARSTEN-3 flow.  Then JL 4 diagnostic was used to do left 3 arteriogram.  Pigtail was used to do LV measurements and hand-injection LV gram.  Patient tolerated procedure well.  Complications none.      FINDINGS:    Hemodynamic :  1. Left ventricular pressure: 125/6  LVEDP:6  2. Aortic pressure: 118/66    AV Gradient:  no      LEFT VENTRICULOGRAPHY:    Wall Motion: Inferior wall hypokinesis  Mitral regurgitation: Difficult to see in the send ejection  Estimated EF : Difficult evaluating the send ejection    Coronary Arteriography: (Please Code highest degree of stenosis)  Dominance:  []  Left  [x]  Right  []  Co-Dominant    Left Main %: Is without disease    Proximal LAD %: 30% calcified proximal and mid narrowing  Mid/Distal LAD %: There are 2 large diagonals in midportion without disease    LCX %: Bap [place] to high marginal which looks like ramus and distal circumflex continues in distal area and divides into 2 branches.  First marginal has calcified 30 to 40% proximal narrowing.      RCA %: 99% mid was subjected to drug-eluting stents with 4.5 x 28 and 4x18 Xience CLARY with reduction of lesion to 0 with brisk KARSTEN-3 flow    CORONARY INTERVENTION FINDINGS:  PCI Procedure Notes    Segment # proximal to midRCA  Culprit Lesion:  [x]  Yes  []  No  % Pre-Stenosis: 99% with haziness consistent with thrombus  Pre-Proc TIMIFlow: 1  % Post-Stenosis: 0  Post-Proc TIMIFlow: 3  Non-High/Non-C:                          High/C:yes  Lesion Length (mm):  Primary Device Used: 4.5 x 28 and 4 x 18 Xience CLARY      Conscious Sedation:   [x]  Yes   []  No  No Flow Phenom:       []  Yes  [x]  No  Dissection:  []  Yes  [x]  No  Acute Closure: []  Yes  [x]  No  Perforation:  []  Yes  [x]  No    Complications:  none  Estimated Blood Loss:  5cc.      Impression :    Successful drug-eluting stent to proximal and mid  RCA    Recommendation:    Routine STEMI care  Admit to [facility]  Integrilin for 12 hours, monitor for toxicities.  Dual antiplatelet therapy with aspirin and Brilinta as tolerated  Statins and beta-blockers as tolerated  Serial troponins  Patient's renal function came back elevated.  Will hydrate per protocol with 3 mL/kg for the first 4 hours then cut the fluid down to 50 cc an hour for 24 hours.  Monitor renal function.  Check echocardiogram.  Telemetry to monitor rhythm.  Discussed with intensivist nurse practitioner Beatriz      Pre-Procedure Notes  H&P Performed  [x]  Yes []  No       []  N/A    Indications:  []  ACS <= 24 HRS  []  ACS >24 HRS  []  New Onset Angina <= 2 mos  []  Worsening Angina  []  Resuscitated Cardiac Arrest  []  Angina on Exertion:  []  Suspected CAD  []  Valvular Disease  []  Pericardial Disease  []  Cardiac Arrythmia  []  Cardiomyopathy  []  LV Dysfunction  []  Syncope  []  Post Cardiac Transplant  []  Eval. For Exercise Clearance  Non-Vascular    Risks, Benefits, & Complications Discussed:  [x]  Yes  []  No  []  N/A    Questions Answered:  [x]  Yes  []  No  []  N/A    Consent Obtained:  [x]  Yes  []  No  []  N/A    CHF: []  Yes  [x]  No  If Yes:  Newly Diagnosed?  []  Yes  []  No  If Yes:  HF Type:  []  Diastolic  []  Systolic  []  Unknown  Electronically signed by Warner Zaidi MD, 06/30/25, 8:21 PM EDT.      Lab Review   I have reviewed the labs  Results from last 7 days   Lab Units 07/02/25  0229 07/01/25  1436 07/01/25  0349   HSTROP T ng/L 577* 513* 605*     Results from last 7 days   Lab Units 06/30/25  1528   MAGNESIUM mg/dL 2.3     Results from last 7 days   Lab Units 07/02/25  0229   SODIUM mmol/L 137   POTASSIUM mmol/L 4.5   BUN mg/dL 12.9   CREATININE mg/dL 1.35*   CALCIUM mg/dL 8.8         Results from last 7 days   Lab Units 07/01/25  0349 06/30/25  1528   WBC 10*3/mm3 11.57* 9.93   HEMOGLOBIN g/dL 16.7 18.1*   HEMATOCRIT % 49.6 53.1*   PLATELETS 10*3/mm3 163 193  "    Results from last 7 days   Lab Units 06/30/25  1528   INR  1.03       RADIOLOGY:  Imaging Results (Last 24 Hours)       ** No results found for the last 24 hours. **                  )7/2/2025  MD CHARLEEN Beverly/Transcription:   \"Dictated utilizing Dragon dictation\".   "

## 2025-07-10 ENCOUNTER — TELEPHONE (OUTPATIENT)
Dept: CARDIAC REHAB | Facility: HOSPITAL | Age: 55
End: 2025-07-10
Payer: COMMERCIAL

## 2025-07-10 NOTE — TELEPHONE ENCOUNTER
RE: cardiac rehab referral:   Called patient to discuss referral-unable to leave voicemail due to mailbox full. Will try again at later time.

## 2025-07-19 NOTE — PROGRESS NOTES
Subjective:     Encounter Date:07/21/2025      Patient ID: Amrik Dowell is a 55 y.o. male.    Chief Complaint and history of present illness:    Follow-up for CAD, MI, PCI, cigarette smoker, dyslipidemia       Objective: HS Troponin 284--[id number]   7/1/2025: potassium 3.8, bun 13 creatinine 1.55 A1C 5.31 HDL 33 LDL 88 trigs 204  WBC 11.57   7/2/2025: HS troponin 577 potassium 4.5 creatinine 1.35      History of present illness:    Mr. Amrik Dowell has PMH of     -CAD, MI 7/1/2025 with peak troponin of 577, PCI  -Hypertension  -Erythrocytosis/polycythemia  -Positive family history of premature CAD in grandfather  -Cigarette smoker    Here for hospital follow-up.  Patient was in the hospital 7/1/2025 with MI and had PCI.  Is here for follow-up.  Patient denies any chest pain or shortness of breath.     Review of records revealed that patient presented late through emergency room with complaint of chest pain.  Patient has been having chest pain for over a day now which is burning and type radiating to the left  shoulder, thought it was acid reflux worse therefore came into the hospital.  Was found to have ST elevation inferiorly therefore interventional cardiology was consulted.  Patient was taken to the Cath Lab for was found to have CAD underwent CLARY.  Echo revealed LV dysfunction.  Patient had renal insufficiency therefore was not started on ACE inhibitors or ARB.  Patient continues to smoke in spite of counseling.  He is trying to quit and is wearing a patch currently.    Patient's arterial blood pressure is 115/58, heart rate 65 bpm, O2 sat of 96% on room air.    Review of records:    Labs from 7/1/2025 reveal lipid profile with cholesterol 156, triglycerides 204, HDL low at 33, LDL 88.  Creatinine of 1.45, EGFR 57.  Repeat labs 7/2/2025 revealed creatinine of 1.35.        Assessment:  :     CAD, STEMI, PCI  Cardiomyopathy, ischemic  Hypertension  Dyslipidemia  Elevated creatinine/renal  insufficiency  Positive family history  Cigarette smoker        Recommendations / Plan:         Patient presented 6/30/2025 through emergency room with complaint of chest pain, going on for over a day, had ST elevation was taken to Cath Lab for primary PCI.  Patient's LVEDP was low.  Patient was given Integrilin aspirin and Brilinta  Patient underwent PCI to RCA.  Creatinine was elevated at 1.55 came down to 1.35.  Continue antiplatelet therapy with aspirin and Brilinta as tolerated.  Continue medical management with aspirin, Brilinta, bisoprolol, amlodipine, atorvastatin, Zetia, Farxiga and as needed nitroglycerin as tolerated.  Will hold off on ACE inhibitors, ARB, Aldactone because of renal insufficiency.  Will send him to nephrology.  Will start ACE inhibitor's once renal function improves.  Counseled on smoking cessation.  Send him to cardiac rehab.  Reviewed EKG results with patient.  Will follow-up closely in 3 months and check labs before visit.        ECG 12 Lead    Date/Time: 7/21/2025 11:46 PM  Performed by: Warner Zaidi MD    Authorized by: Warner Zaidi MD  Comparison: compared with previous ECG from 6/30/2025  Comparison to previous ECG: EKG done today reviewed/interpreted by me reveals sinus rhythm at the rate of 60 bpm with inferior wall MI with T wave inversions in inferior leads, compared to EKG from 6/30/2025 no significant change.          ECHOCARDIOGRAM:  Results for orders placed during the hospital encounter of 06/30/25    Echocardiogram 2D complete 07/01/2025 12:58 PM    Interpretation Summary    Left ventricular ejection fraction appears to be 36 - 40%.    The following left ventricular wall segments are hypokinetic: Inferior and inferior septal    Left ventricular diastolic function is consistent with (grade II w/high LAP) pseudonormalization.    Estimated right ventricular systolic pressure from tricuspid regurgitation is normal (<35 mmHg).    IVC is dilated.  2.3  cm.      STRESS TEST          HEART CATHETERIZATION  Results for orders placed during the hospital encounter of 25    Cardiac Catheterization/Vascular Study    Conclusion  CARDIAC CATHETERIZATION PCI REPORT      NAME:              Amrik Dowell  :                1970  AGE/SEX:        55 y.o. male  MRN:                [id number]    2025     : Warner Zaidi.    DATE OF PROCEDURE: 2025    INDICATION FOR PROCEDURE: Acute ST elevation inferior myocardial infarction    PROCEDURE PERFORMED:  1.  Left heart cath, coronary arteriogram and left ventriculogram  2.  PTCA and drug-eluting stent to proximal and mid RCA        Procedure Description :    The patient underwent successful [x]  Left  []  Right  []  Left & Right  Heart catheterization and coronary angiography via the  [x]  Femoral approach  []  Radial approach  []  Brachial approach    Procedure Narrative:    After informed consent, under conscious sedation,  under conscious sedation, given and monitored by me,moderate conscious sedation was given utilizing IV Versed and fentanyl administered by RN with continuous EKG oximetry and hemodynamic monitoring supervised by me throughout the entire case.  I was present with the patient for the duration of moderate sedation and supervised staff who had no other duties and monitored the patient for the entire procedure patient had Bullard 2-3 sedation scale .  And local anesthesia a 6 Belgian sheath was placed in right femoral artery.  6 Belgian hockey-stick guide was used to engage the right coronary ostium and obtain guiding shots.  This revealed that right coronary artery was the culprit vessel with hazy thrombotic lesion in the majority of vertical segment of proximal and mid RCA then in the distal portion of the vertical segment there was a 99% stenosis with haziness consistent with thrombus.  Therefore after giving heparin and Integrilin intravenously a run-through wire was used to  cross the lesion it was predilated.  The distal lesion was stented with 4 x 18 Xience and the proximal and mid lesion was stented with 4.5 x 28 Xience CLARY with reduction of lesion to 0 with this KARSTEN-3 flow.  Then JL 4 diagnostic was used to do left 3 arteriogram.  Pigtail was used to do LV measurements and hand-injection LV gram.  Patient tolerated procedure well.  Complications none.      FINDINGS:    Hemodynamic :  1. Left ventricular pressure: 125/6  LVEDP:6  2. Aortic pressure: 118/66    AV Gradient:  no      LEFT VENTRICULOGRAPHY:    Wall Motion: Inferior wall hypokinesis  Mitral regurgitation: Difficult to see in the send ejection  Estimated EF : Difficult evaluating the send ejection    Coronary Arteriography: (Please Code highest degree of stenosis)  Dominance:  []  Left  [x]  Right  []  Co-Dominant    Left Main %: Is without disease    Proximal LAD %: 30% calcified proximal and mid narrowing  Mid/Distal LAD %: There are 2 large diagonals in midportion without disease    LCX %: Bap Gondi to high marginal which looks like ramus and distal circumflex continues in distal area and divides into 2 branches.  First marginal has calcified 30 to 40% proximal narrowing.      RCA %: 99% mid was subjected to drug-eluting stents with 4.5 x 28 and 4x18 Xience CLARY with reduction of lesion to 0 with brisk KARSTEN-3 flow    CORONARY INTERVENTION FINDINGS:  PCI Procedure Notes    Segment # proximal to midRCA  Culprit Lesion:  [x]  Yes  []  No  % Pre-Stenosis: 99% with haziness consistent with thrombus  Pre-Proc TIMIFlow: 1  % Post-Stenosis: 0  Post-Proc TIMIFlow: 3  Non-High/Non-C:                          High/C:yes  Lesion Length (mm):  Primary Device Used: 4.5 x 28 and 4 x 18 Xience CLARY      Conscious Sedation:   [x]  Yes   []  No  No Flow Phenom:       []  Yes  [x]  No  Dissection:  []  Yes  [x]  No  Acute Closure: []  Yes  [x]  No  Perforation:  []  Yes  [x]  No    Complications:  none  Estimated Blood Loss:   5cc.      Impression :    Successful drug-eluting stent to proximal and mid RCA    Recommendation:    Routine STEMI care  Admit to [facility]  Integrilin for 12 hours, monitor for toxicities.  Dual antiplatelet therapy with aspirin and Brilinta as tolerated  Statins and beta-blockers as tolerated  Serial troponins  Patient's renal function came back elevated.  Will hydrate per protocol with 3 mL/kg for the first 4 hours then cut the fluid down to 50 cc an hour for 24 hours.  Monitor renal function.  Check echocardiogram.  Telemetry to monitor rhythm.  Discussed with intensivist nurse practitioner Beatriz    Electronically signed by Warner Zaidi MD, 06/30/25, 8:21 PM EDT.      Copied text in this portion of the note has been reviewed and is accurate as of 7/22/2025  The following portions of the patient's history were reviewed and updated as appropriate: allergies, current medications, past family history, past medical history, past social history, past surgical history and problem list.    Assessment:         MDM       Diagnosis Plan   1. CAD S/P percutaneous coronary angioplasty  Ambulatory Referral to Nephrology    Cardiac Rehab Phase II    Comprehensive Metabolic Panel    Lipid Panel      2. History of ST elevation myocardial infarction (STEMI)  Ambulatory Referral to Nephrology    Cardiac Rehab Phase II    Comprehensive Metabolic Panel    Lipid Panel      3. Dyslipidemia  Ambulatory Referral to Nephrology    Cardiac Rehab Phase II    Comprehensive Metabolic Panel    Lipid Panel      4. Essential hypertension  Ambulatory Referral to Nephrology    Cardiac Rehab Phase II    Comprehensive Metabolic Panel    Lipid Panel      5. Renal insufficiency  Ambulatory Referral to Nephrology    Cardiac Rehab Phase II    Comprehensive Metabolic Panel    Lipid Panel             Plan:               Past Medical History:  History reviewed. No pertinent past medical history.  Past Surgical History:  Past Surgical History:    Procedure Laterality Date    CARDIAC CATHETERIZATION N/A 6/30/2025    Procedure: Left Heart Cath;  Surgeon: Warner Zaidi MD;  Location: [facility] CATH INVASIVE LOCATION;  Service: Cardiology;  Laterality: N/A;    CARDIAC CATHETERIZATION N/A 6/30/2025    Procedure: Percutaneous Coronary Intervention;  Surgeon: Warner Zaidi MD;  Location: [facility] CATH INVASIVE LOCATION;  Service: Cardiology;  Laterality: N/A;  RCA    CARDIAC CATHETERIZATION N/A 6/30/2025    Procedure: Stent CLARY coronary;  Surgeon: Warner Zaidi MD;  Location: [facility] CATH INVASIVE LOCATION;  Service: Cardiology;  Laterality: N/A;  RCA      Allergies:  No Known Allergies  Home Meds:  Current Meds:     Current Outpatient Medications:     amLODIPine (NORVASC) 5 MG tablet, Take 1 tablet by mouth Daily for 360 days., Disp: 90 tablet, Rfl: 3    aspirin 81 MG EC tablet, Take 1 tablet by mouth Daily for 360 days., Disp: 90 tablet, Rfl: 3    atorvastatin (LIPITOR) 40 MG tablet, Take 1 tablet by mouth Every Night for 360 days., Disp: 90 tablet, Rfl: 3    bisoprolol (ZEBeta) 5 MG tablet, Take 1 tablet by mouth Daily for 360 days., Disp: 90 tablet, Rfl: 3    dapagliflozin Propanediol 10 MG tablet, Take 10 mg by mouth Daily., Disp: 90 tablet, Rfl: 3    nicotine (NICODERM CQ) 21 MG/24HR patch, Place 1 patch on the skin as directed by provider Daily for 19 days., Disp: 19 patch, Rfl: 0    nitroglycerin (NITROSTAT) 0.4 MG SL tablet, Place 1 tablet under the tongue Every 5 (Five) Minutes As Needed for Chest Pain for up to 30 doses. Take no more than 3 doses in 15 minutes., Disp: 30 tablet, Rfl: 3    ticagrelor (BRILINTA) 90 MG tablet tablet, Take 1 tablet by mouth 2 (Two) Times a Day for 360 days., Disp: 180 tablet, Rfl: 3    ezetimibe (ZETIA) 10 MG tablet, Take 1 tablet by mouth Daily., Disp: 90 tablet, Rfl: 3  Social History:   Social History     Tobacco Use    Smoking status: Former     Types: Cigarettes     Passive exposure:  "Current    Smokeless tobacco: Not on file   Substance Use Topics    Alcohol use: Yes      Family History:  History reviewed. No pertinent family history.           Review of Systems   Cardiovascular:  Negative for chest pain, leg swelling and palpitations.   Respiratory:  Negative for shortness of breath.    Neurological:  Negative for dizziness and numbness.     All other systems are negative         Objective:     Physical Exam  /58 (BP Location: Left arm, Patient Position: Sitting, Cuff Size: Large Adult)   Pulse 65   Ht 190.5 cm (75\")   Wt 103 kg (227 lb)   SpO2 96%   BMI 28.37 kg/m²   General:  Appears in no acute distress  Eyes: Sclera is anicteric,  conjunctiva is clear   HEENT:  No JVD.  No carotid bruits  Respiratory: Respirations regular and unlabored at rest.  Clear to auscultation  Cardiovascular: S1,S2 Regular rate and rhythm. .   Extremities: No digital clubbing or cyanosis, no edema  Skin: Color pink. Skin warm and dry to touch. No rashes  No xanthoma  Neuro: Alert and awake.    Lab Reviewed:         Warner Zaidi MD  7/22/2025 00:19 EDT      EMR Dragon/Transcription:   \"Dictated utilizing Dragon dictation\".        "

## 2025-07-21 ENCOUNTER — OFFICE VISIT (OUTPATIENT)
Dept: CARDIOLOGY | Facility: CLINIC | Age: 55
End: 2025-07-21
Payer: COMMERCIAL

## 2025-07-21 VITALS
BODY MASS INDEX: 28.23 KG/M2 | OXYGEN SATURATION: 96 % | DIASTOLIC BLOOD PRESSURE: 58 MMHG | HEART RATE: 65 BPM | HEIGHT: 75 IN | SYSTOLIC BLOOD PRESSURE: 115 MMHG | WEIGHT: 227 LBS

## 2025-07-21 DIAGNOSIS — Z98.61 CAD S/P PERCUTANEOUS CORONARY ANGIOPLASTY: Primary | ICD-10-CM

## 2025-07-21 DIAGNOSIS — I25.10 CAD S/P PERCUTANEOUS CORONARY ANGIOPLASTY: Primary | ICD-10-CM

## 2025-07-21 DIAGNOSIS — I10 ESSENTIAL HYPERTENSION: ICD-10-CM

## 2025-07-21 DIAGNOSIS — E78.5 DYSLIPIDEMIA: ICD-10-CM

## 2025-07-21 DIAGNOSIS — I25.2 HISTORY OF ST ELEVATION MYOCARDIAL INFARCTION (STEMI): ICD-10-CM

## 2025-07-21 DIAGNOSIS — N28.9 RENAL INSUFFICIENCY: ICD-10-CM

## 2025-07-21 RX ORDER — BISOPROLOL FUMARATE 5 MG/1
5 TABLET, FILM COATED ORAL
Qty: 90 TABLET | Refills: 3 | Status: SHIPPED | OUTPATIENT
Start: 2025-07-21 | End: 2026-07-16

## 2025-07-21 RX ORDER — NITROGLYCERIN 0.4 MG/1
0.4 TABLET SUBLINGUAL
Qty: 30 TABLET | Refills: 3 | Status: SHIPPED | OUTPATIENT
Start: 2025-07-21

## 2025-07-21 RX ORDER — ASPIRIN 81 MG/1
81 TABLET ORAL DAILY
Qty: 90 TABLET | Refills: 3 | Status: SHIPPED | OUTPATIENT
Start: 2025-07-21 | End: 2026-07-16

## 2025-07-21 RX ORDER — AMLODIPINE BESYLATE 5 MG/1
5 TABLET ORAL
Qty: 90 TABLET | Refills: 3 | Status: SHIPPED | OUTPATIENT
Start: 2025-07-21 | End: 2026-07-16

## 2025-07-21 RX ORDER — TICAGRELOR 90 MG/1
90 TABLET, FILM COATED ORAL 2 TIMES DAILY
Qty: 180 TABLET | Refills: 3 | Status: SHIPPED | OUTPATIENT
Start: 2025-07-21 | End: 2026-07-16

## 2025-07-21 RX ORDER — ATORVASTATIN CALCIUM 40 MG/1
40 TABLET, FILM COATED ORAL NIGHTLY
Qty: 90 TABLET | Refills: 3 | Status: SHIPPED | OUTPATIENT
Start: 2025-07-21 | End: 2026-07-16

## 2025-07-21 RX ORDER — EZETIMIBE 10 MG/1
10 TABLET ORAL DAILY
Qty: 90 TABLET | Refills: 3 | Status: SHIPPED | OUTPATIENT
Start: 2025-07-21

## 2025-07-21 RX ORDER — DAPAGLIFLOZIN 10 MG/1
10 TABLET, FILM COATED ORAL DAILY
Qty: 90 TABLET | Refills: 3 | Status: SHIPPED | OUTPATIENT
Start: 2025-07-21

## 2025-07-23 ENCOUNTER — TELEPHONE (OUTPATIENT)
Dept: CARDIOLOGY | Facility: CLINIC | Age: 55
End: 2025-07-23
Payer: COMMERCIAL

## 2025-07-31 RX ORDER — DAPAGLIFLOZIN 10 MG/1
10 TABLET, FILM COATED ORAL DAILY
Qty: 90 TABLET | Refills: 3 | Status: SHIPPED | OUTPATIENT
Start: 2025-07-31

## 2025-07-31 RX ORDER — NICOTINE 21 MG/24HR
1 PATCH, TRANSDERMAL 24 HOURS TRANSDERMAL
Qty: 19 PATCH | Refills: 0 | Status: CANCELLED | OUTPATIENT
Start: 2025-07-31 | End: 2025-08-19

## 2025-07-31 NOTE — TELEPHONE ENCOUNTER
Rx Refill Note  Requested Prescriptions     Pending Prescriptions Disp Refills    dapagliflozin Propanediol 10 MG tablet 90 tablet 3     Sig: Take 10 mg by mouth Daily.      Last office visit with prescribing clinician: 7/21/2025   Last telemedicine visit with prescribing clinician: Visit date not found   Next office visit with prescribing clinician: 10/23/2025                         Would you like a call back once the refill request has been completed: [] Yes [] No    If the office needs to give you a call back, can they leave a voicemail: [] Yes [] No    Mar Bailey MA  07/31/25, 10:28 EDT

## 2025-08-11 ENCOUNTER — OFFICE VISIT (OUTPATIENT)
Dept: CARDIAC REHAB | Facility: HOSPITAL | Age: 55
End: 2025-08-11
Payer: COMMERCIAL

## 2025-08-11 DIAGNOSIS — Z95.5 S/P DRUG ELUTING CORONARY STENT PLACEMENT: Primary | ICD-10-CM

## 2025-08-11 DIAGNOSIS — I25.2 HISTORY OF ST ELEVATION MYOCARDIAL INFARCTION (STEMI): ICD-10-CM

## 2025-08-11 PROCEDURE — 93798 PHYS/QHP OP CAR RHAB W/ECG: CPT

## 2025-08-14 ENCOUNTER — TREATMENT (OUTPATIENT)
Dept: CARDIAC REHAB | Facility: HOSPITAL | Age: 55
End: 2025-08-14
Payer: COMMERCIAL

## 2025-08-14 DIAGNOSIS — Z95.5 S/P DRUG ELUTING CORONARY STENT PLACEMENT: ICD-10-CM

## 2025-08-14 DIAGNOSIS — I25.2 HISTORY OF ST ELEVATION MYOCARDIAL INFARCTION (STEMI): Primary | ICD-10-CM

## 2025-08-14 PROCEDURE — 93798 PHYS/QHP OP CAR RHAB W/ECG: CPT

## 2025-08-19 ENCOUNTER — TREATMENT (OUTPATIENT)
Dept: CARDIAC REHAB | Facility: HOSPITAL | Age: 55
End: 2025-08-19
Payer: COMMERCIAL

## 2025-08-19 DIAGNOSIS — I25.2 HISTORY OF ST ELEVATION MYOCARDIAL INFARCTION (STEMI): Primary | ICD-10-CM

## 2025-08-19 PROCEDURE — 93798 PHYS/QHP OP CAR RHAB W/ECG: CPT

## 2025-08-26 ENCOUNTER — TREATMENT (OUTPATIENT)
Dept: CARDIAC REHAB | Facility: HOSPITAL | Age: 55
End: 2025-08-26
Payer: COMMERCIAL

## 2025-08-26 DIAGNOSIS — I25.2 HISTORY OF ST ELEVATION MYOCARDIAL INFARCTION (STEMI): Primary | ICD-10-CM

## 2025-08-26 DIAGNOSIS — Z95.5 S/P DRUG ELUTING CORONARY STENT PLACEMENT: ICD-10-CM

## 2025-08-26 PROCEDURE — 93798 PHYS/QHP OP CAR RHAB W/ECG: CPT

## 2025-08-28 RX ORDER — DAPAGLIFLOZIN 10 MG/1
10 TABLET, FILM COATED ORAL DAILY
Qty: 90 TABLET | Refills: 3 | OUTPATIENT
Start: 2025-08-28

## (undated) DEVICE — BALN EUPHORA 2.5X12MM

## (undated) DEVICE — ELECTRD DEFIB M/FUNC PROPADZ RADIOL 2PK

## (undated) DEVICE — GW RUNTHROUGH NS HYPERCOAT .014 3X180CM

## (undated) DEVICE — DGW .035 MC J3MM 150CM T H AMP: Brand: EMERALD

## (undated) DEVICE — DEV INFL COMPAK W/ACCESSPLUS IN4530

## (undated) DEVICE — CATH DIAG IMPULSE FL4 6F 100CM

## (undated) DEVICE — PK TRY HEART CATH 50

## (undated) DEVICE — PINNACLE INTRODUCER SHEATH: Brand: PINNACLE

## (undated) DEVICE — Device

## (undated) DEVICE — CATH DIAG IMPULSE FR4 6F 100CM

## (undated) DEVICE — NDL PERC 1PRT THNWALL W/BASEPLT 18G 7CM